# Patient Record
Sex: MALE | Race: WHITE | NOT HISPANIC OR LATINO | Employment: OTHER | URBAN - METROPOLITAN AREA
[De-identification: names, ages, dates, MRNs, and addresses within clinical notes are randomized per-mention and may not be internally consistent; named-entity substitution may affect disease eponyms.]

---

## 2017-09-21 ENCOUNTER — HISTORICAL (OUTPATIENT)
Dept: ADMINISTRATIVE | Facility: HOSPITAL | Age: 32
End: 2017-09-21

## 2022-04-09 ENCOUNTER — HISTORICAL (OUTPATIENT)
Dept: ADMINISTRATIVE | Facility: HOSPITAL | Age: 37
End: 2022-04-09

## 2022-04-26 VITALS
DIASTOLIC BLOOD PRESSURE: 77 MMHG | WEIGHT: 174.19 LBS | SYSTOLIC BLOOD PRESSURE: 108 MMHG | BODY MASS INDEX: 26.4 KG/M2 | HEIGHT: 68 IN

## 2024-01-09 DIAGNOSIS — R40.20 LOC (LOSS OF CONSCIOUSNESS): ICD-10-CM

## 2024-01-09 DIAGNOSIS — R42 DIZZINESS: Primary | ICD-10-CM

## 2024-03-16 ENCOUNTER — HOSPITAL ENCOUNTER (EMERGENCY)
Facility: HOSPITAL | Age: 39
Discharge: HOME | End: 2024-03-16
Attending: EMERGENCY MEDICINE
Payer: COMMERCIAL

## 2024-03-16 VITALS
OXYGEN SATURATION: 100 % | RESPIRATION RATE: 13 BRPM | TEMPERATURE: 98 F | HEIGHT: 68 IN | WEIGHT: 178.56 LBS | DIASTOLIC BLOOD PRESSURE: 79 MMHG | SYSTOLIC BLOOD PRESSURE: 130 MMHG | HEART RATE: 62 BPM | BODY MASS INDEX: 27.06 KG/M2

## 2024-03-16 DIAGNOSIS — R07.9 CHEST PAIN: Primary | ICD-10-CM

## 2024-03-16 LAB
ALBUMIN SERPL-MCNC: 4.3 G/DL (ref 3.5–5)
ALBUMIN/GLOB SERPL: 1.2 RATIO (ref 1.1–2)
ALP SERPL-CCNC: 78 UNIT/L (ref 40–150)
ALT SERPL-CCNC: 64 UNIT/L (ref 0–55)
AST SERPL-CCNC: 36 UNIT/L (ref 5–34)
BASOPHILS # BLD AUTO: 0.04 X10(3)/MCL
BASOPHILS NFR BLD AUTO: 0.8 %
BILIRUB SERPL-MCNC: 0.3 MG/DL
BNP BLD-MCNC: <10 PG/ML
BUN SERPL-MCNC: 13.5 MG/DL (ref 8.9–20.6)
CALCIUM SERPL-MCNC: 9.7 MG/DL (ref 8.4–10.2)
CHLORIDE SERPL-SCNC: 110 MMOL/L (ref 98–107)
CO2 SERPL-SCNC: 25 MMOL/L (ref 22–29)
CREAT SERPL-MCNC: 0.82 MG/DL (ref 0.73–1.18)
D DIMER PPP IA.FEU-MCNC: 1.94 UG/ML FEU (ref 0–0.5)
EOSINOPHIL # BLD AUTO: 0.22 X10(3)/MCL (ref 0–0.9)
EOSINOPHIL NFR BLD AUTO: 4.5 %
ERYTHROCYTE [DISTWIDTH] IN BLOOD BY AUTOMATED COUNT: 12.1 % (ref 11.5–17)
GFR SERPLBLD CREATININE-BSD FMLA CKD-EPI: >60 MLS/MIN/1.73/M2
GLOBULIN SER-MCNC: 3.5 GM/DL (ref 2.4–3.5)
GLUCOSE SERPL-MCNC: 98 MG/DL (ref 74–100)
HCT VFR BLD AUTO: 38.4 % (ref 42–52)
HGB BLD-MCNC: 13.9 G/DL (ref 14–18)
IMM GRANULOCYTES # BLD AUTO: 0.01 X10(3)/MCL (ref 0–0.04)
IMM GRANULOCYTES NFR BLD AUTO: 0.2 %
LYMPHOCYTES # BLD AUTO: 1.77 X10(3)/MCL (ref 0.6–4.6)
LYMPHOCYTES NFR BLD AUTO: 36.1 %
MCH RBC QN AUTO: 31.9 PG (ref 27–31)
MCHC RBC AUTO-ENTMCNC: 36.2 G/DL (ref 33–36)
MCV RBC AUTO: 88.1 FL (ref 80–94)
MONOCYTES # BLD AUTO: 0.31 X10(3)/MCL (ref 0.1–1.3)
MONOCYTES NFR BLD AUTO: 6.3 %
NEUTROPHILS # BLD AUTO: 2.55 X10(3)/MCL (ref 2.1–9.2)
NEUTROPHILS NFR BLD AUTO: 52.1 %
NRBC BLD AUTO-RTO: 0 %
PLATELET # BLD AUTO: 315 X10(3)/MCL (ref 130–400)
PMV BLD AUTO: 10.5 FL (ref 7.4–10.4)
POTASSIUM SERPL-SCNC: 3.7 MMOL/L (ref 3.5–5.1)
PROT SERPL-MCNC: 7.8 GM/DL (ref 6.4–8.3)
RBC # BLD AUTO: 4.36 X10(6)/MCL (ref 4.7–6.1)
SODIUM SERPL-SCNC: 143 MMOL/L (ref 136–145)
TROPONIN I SERPL-MCNC: <0.01 NG/ML (ref 0–0.04)
TSH SERPL-ACNC: 0.5 UIU/ML (ref 0.35–4.94)
WBC # SPEC AUTO: 4.9 X10(3)/MCL (ref 4.5–11.5)

## 2024-03-16 PROCEDURE — 85379 FIBRIN DEGRADATION QUANT: CPT | Performed by: PHYSICIAN ASSISTANT

## 2024-03-16 PROCEDURE — 84484 ASSAY OF TROPONIN QUANT: CPT | Performed by: PHYSICIAN ASSISTANT

## 2024-03-16 PROCEDURE — 83880 ASSAY OF NATRIURETIC PEPTIDE: CPT | Performed by: PHYSICIAN ASSISTANT

## 2024-03-16 PROCEDURE — 85025 COMPLETE CBC W/AUTO DIFF WBC: CPT | Performed by: PHYSICIAN ASSISTANT

## 2024-03-16 PROCEDURE — 80053 COMPREHEN METABOLIC PANEL: CPT | Performed by: PHYSICIAN ASSISTANT

## 2024-03-16 PROCEDURE — 25500020 PHARM REV CODE 255

## 2024-03-16 PROCEDURE — 84443 ASSAY THYROID STIM HORMONE: CPT | Performed by: PHYSICIAN ASSISTANT

## 2024-03-16 PROCEDURE — 99285 EMERGENCY DEPT VISIT HI MDM: CPT | Mod: 25

## 2024-03-16 RX ADMIN — IOHEXOL 100 ML: 350 INJECTION, SOLUTION INTRAVENOUS at 12:03

## 2024-03-16 NOTE — ED PROVIDER NOTES
Encounter Date: 3/16/2024          Disregard this template; please see note written on same date of service .         Matt Louie MD  03/18/24 6448

## 2024-03-16 NOTE — ED PROVIDER NOTES
Encounter Date: 3/16/2024       History     Chief Complaint   Patient presents with    Chest Pain    Headache     C/o chest pain intermittently x 1 week with slight headache.      38-year-old male with past medical history significant for thyroid disease and anxiety presents to ED complaining of one-week history intermittent chest pain and mild headache.  Patient reports chest pain seems to come on at random both at rest and with exertion and denies any identifiable aggravating factor.  Patient reports chest pain lasts for 10-15 seconds and feels like his substernal region is compressing into his mid upper back.  Reports 1 episode of chest pain improving with going from lying down to sitting up, but denies any consistent identifiable alleviating factor.  Patient reports when chest pain occurs he has associated shortness of breath, palpitations and nausea.  Patient reports he was previously referred to a cardiologist by his PCP for low blood pressure and he wore a Holter monitor for one-week and was told he had frequent PACs and PVCs and also that his heart rate would drop into the 30s while he was sleeping.  Patient reports cardiologist told him outside of these things there was nothing wrong with him and nothing needed to be done about it.  Reports he also had an echo, but does not know the results.  Reports he did not ever have a stress test.  Denies diaphoresis, syncope, orthopnea, edema, calf pain, hemoptysis, abdominal pain, vomiting.  Vital signs stable on arrival, patient in no acute distress.      Review of patient's allergies indicates:   Allergen Reactions    Meclizine Other (See Comments)     Severe mood swings     Past Medical History:   Diagnosis Date    Thyroid disease      History reviewed. No pertinent surgical history.  History reviewed. No pertinent family history.  Social History     Tobacco Use    Smoking status: Never    Smokeless tobacco: Never   Substance Use Topics    Alcohol use: Yes      Comment: occ    Drug use: Never     Review of Systems   All other systems reviewed and are negative.      Physical Exam     Initial Vitals [03/16/24 0913]   BP Pulse Resp Temp SpO2   132/86 60 20 97.5 °F (36.4 °C) 99 %      MAP       --         Physical Exam    Nursing note and vitals reviewed.  Constitutional: He appears well-developed and well-nourished. He is not diaphoretic. No distress.   HENT:   Head: Normocephalic and atraumatic.   Eyes: Conjunctivae and EOM are normal. Pupils are equal, round, and reactive to light.   Neck: Neck supple. No tracheal deviation present. No JVD present.   Normal range of motion.  Cardiovascular:  Normal rate, regular rhythm, normal heart sounds and intact distal pulses.     Exam reveals no gallop and no friction rub.       No murmur heard.  Pulmonary/Chest: Breath sounds normal. No respiratory distress. He has no wheezes. He has no rhonchi. He has no rales.   Abdominal: Abdomen is soft. Bowel sounds are normal. He exhibits no distension, no abdominal bruit and no pulsatile midline mass. There is no abdominal tenderness. There is no rebound and no guarding.   Musculoskeletal:         General: No tenderness or edema. Normal range of motion.      Cervical back: Normal range of motion and neck supple.     Neurological: He is alert and oriented to person, place, and time. He has normal strength. No cranial nerve deficit or sensory deficit. GCS score is 15. GCS eye subscore is 4. GCS verbal subscore is 5. GCS motor subscore is 6.   Skin: Skin is warm and dry. Capillary refill takes less than 2 seconds. No pallor.   Psychiatric: His speech is normal and behavior is normal. Judgment and thought content normal. His mood appears anxious. His affect is not angry, not blunt, not labile and not inappropriate. He is not actively hallucinating. Cognition and memory are normal. He does not exhibit a depressed mood. He is attentive.         ED Course   Procedures  Labs Reviewed   COMPREHENSIVE  METABOLIC PANEL - Abnormal; Notable for the following components:       Result Value    Chloride 110 (*)     Alanine Aminotransferase 64 (*)     Aspartate Aminotransferase 36 (*)     All other components within normal limits   D DIMER, QUANTITATIVE - Abnormal; Notable for the following components:    D-Dimer 1.94 (*)     All other components within normal limits   CBC WITH DIFFERENTIAL - Abnormal; Notable for the following components:    RBC 4.36 (*)     Hgb 13.9 (*)     Hct 38.4 (*)     MCH 31.9 (*)     MCHC 36.2 (*)     MPV 10.5 (*)     All other components within normal limits   B-TYPE NATRIURETIC PEPTIDE - Normal   TROPONIN I - Normal   TSH - Normal   CBC W/ AUTO DIFFERENTIAL    Narrative:     The following orders were created for panel order CBC Auto Differential.  Procedure                               Abnormality         Status                     ---------                               -----------         ------                     CBC with Differential[[id number]]       Abnormal            Final result                 Please view results for these tests on the individual orders.   EXTRA TUBES    Narrative:     The following orders were created for panel order EXTRA TUBES.  Procedure                               Abnormality         Status                     ---------                               -----------         ------                     Red Top Hold[[id number]]                                                               Gold Top Hold[[id number]]                                                                Please view results for these tests on the individual orders.   RED TOP HOLD   GOLD TOP HOLD     EKG Readings: (Independently Interpreted)   Initial Reading: No STEMI. Rhythm: Normal Sinus Rhythm. Heart Rate: 61. Ectopy: PACs. Conduction: Normal. ST Segments: Normal ST Segments. Axis: Normal. Clinical Impression: Normal Sinus Rhythm       Imaging Results              CTA Chest Non-Coronary (PE  Studies) (Final result)  Result time 03/16/24 12:22:24      Final result by Remington Li MD (03/16/24 12:22:24)                   Impression:      No evidence of pulmonary thromboembolism.      Electronically signed by: Remington Li  Date:    03/16/2024  Time:    12:22               Narrative:    EXAMINATION:  CTA CHEST NON CORONARY (PE STUDIES)    CLINICAL HISTORY:  Pulmonary embolism (PE) suspected, positive D-dimer;    TECHNIQUE:  Axial images of the chest were obtained with contrast. Sagittal and coronal reconstructed images were available for review. 3-D MIP reconstructions were performed from source imaging.    Automatic dose control was utilized to reduce patient radiation dose.    DLP= 243    COMPARISON:  03/16/2024    FINDINGS:  PULMONARY ARTERY: No evidence of pulmonary thromboembolism.    AORTA: Normal in course and caliber.    THYROID GLAND: The visualized thyroid is unremarkable.    AIRWAYS: Trachea is midline and tracheobronchial tree is patent.    HEART: The heart is normal in size. There is no pericardial effusion.    LUNGS: There are no new masses or nodules identified. No pleural effusion or pneumothorax.    LYMPH NODES: There is no significant mediastinal, axillary or hilar lymphadenopathy.    BONES: No displaced fracture. No aggressive appearing osseous lesion identified.    UPPER ABDOMEN:  The visualized abdomen is unremarkable.                                         X-Ray Chest 1 View (Final result)  Result time 03/16/24 10:53:16      Final result by Remington Li MD (03/16/24 10:53:16)                   Impression:      No acute cardiopulmonary process.      Electronically signed by: Remington Li  Date:    03/16/2024  Time:    10:53               Narrative:    EXAMINATION:  XR CHEST 1 VIEW    CLINICAL HISTORY:  chest pain;    TECHNIQUE:  Single view of the chest    COMPARISON:  02/10/2020    FINDINGS:  No focal opacification, pleural effusion, or pneumothorax.    The  cardiomediastinal silhouette is within normal limits.    No acute osseous abnormality.                                       Medications   iohexoL (OMNIPAQUE 350) 350 mg iodine/mL injection (100 mLs  Given 3/16/24 1200)     Medical Decision Making  Differential diagnosis: Includes but not limited to ACS, arrhythmia, electrolyte abnormality, CHF, pericarditis, aortic dissection, PE, anxiety    ED management:  No indication for acute intervention in ED at this time.    ED course:  EKG reveals rare PACs, but otherwise unremarkable, including no ischemic changes.  Troponin within normal limits.  Patient has low risk heart and GINGER scores, low suspicion for ACS.  BNP within normal limits and no evidence of fluid overload on exam, low suspicion for acute CHF.  CBC reveals mild normocytic, hypochromic anemia with H&H of 13.9 and 38.4; overall unremarkable.  CMP reveals mild transaminitis with ALT of 64 and AST of 36; overall unremarkable.  D-dimer elevated to 1.94 so CTA ordered, but shows no evidence of PE, aortic dissection or other acute cardiopulmonary abnormality.  Patient is nontoxic appearing with normal vitals, stable for discharge.  I will place referral to Internal Medicine Clinic to establish PCP and to cardiology clinic for further evaluation.  Strict ED precautions given for new or worsening symptoms and patient verbalized understanding.  All test results explained and all questions answered.    Amount and/or Complexity of Data Reviewed  Labs: ordered. Decision-making details documented in ED Course.  Radiology: ordered. Decision-making details documented in ED Course.  ECG/medicine tests: ordered and independent interpretation performed. Decision-making details documented in ED Course.               ED Course as of 03/16/24 1250   Sat Mar 16, 2024   1103 Negative troponin ; [CT]   1103 Normal bnp ; [CT]   1103 Modestly elevated dimer ; [CT]   1103 Reassuring chemistries ; [CT]   1104 Reassuring hemogram ;  [CT]      ED Course User Index  [CT] Matt Louie MD                           Clinical Impression:  Final diagnoses:  [R07.9] Chest pain (Primary)          ED Disposition Condition    Discharge Good          ED Prescriptions    None       Follow-up Information       Follow up With Specialties Details Why Contact Info Additional Information    [facility] Internal Medicine In 2 weeks  [address] [facility] [address] [phone] Internal Medicine Clinic Entrance #1    [facility] Cardiology Cardiology In 2 weeks  [address] [facility] [address] [phone]     [facility] Emergency Dept Emergency Medicine  As needed, If symptoms worsen Critical access hospital[address] [facility] [address] [phone]              Chemo Russo PA  03/16/24 5854

## 2024-03-16 NOTE — DISCHARGE INSTRUCTIONS
Report to Emergency Department if symptoms return or worsen; [facility] Medicine Clinic Within 1 to 2 days, It is important that you follow up with your primary care provider or specialist if indicated for further evaluation, workup, and treatment as necessary. The exam and treatment you received in Emergency Department was for an urgent problem and NOT INTENDED AS COMPLETE CARE. It is important that you FOLLOW UP with a doctor for ongoing care. If your symptoms become WORSE or you DO NOT IMPROVE and you are unable to reach your health care provider, you should RETURN to the Emergency Department. The Emergency Department provider has provided a PRELIMINARY INTERPRETATION of all your studies. A final interpretation may be done after you are discharged. If a change in your diagnosis or treatment is needed WE WILL CONTACT YOU. It is critical that we have a CURRENT PHONE NUMBER FOR YOU.

## 2024-03-27 ENCOUNTER — OFFICE VISIT (OUTPATIENT)
Dept: NEUROLOGY | Facility: CLINIC | Age: 39
End: 2024-03-27
Payer: COMMERCIAL

## 2024-03-27 VITALS
HEIGHT: 68 IN | WEIGHT: 175 LBS | SYSTOLIC BLOOD PRESSURE: 122 MMHG | BODY MASS INDEX: 26.52 KG/M2 | DIASTOLIC BLOOD PRESSURE: 84 MMHG

## 2024-03-27 DIAGNOSIS — R20.2 PARESTHESIAS IN LEFT HAND: Primary | ICD-10-CM

## 2024-03-27 DIAGNOSIS — M79.601 PAIN IN BOTH UPPER EXTREMITIES: ICD-10-CM

## 2024-03-27 DIAGNOSIS — R40.20 LOC (LOSS OF CONSCIOUSNESS): ICD-10-CM

## 2024-03-27 DIAGNOSIS — R42 DIZZINESS: ICD-10-CM

## 2024-03-27 DIAGNOSIS — R20.2 PARESTHESIAS IN RIGHT HAND: ICD-10-CM

## 2024-03-27 DIAGNOSIS — M79.602 PAIN IN BOTH UPPER EXTREMITIES: ICD-10-CM

## 2024-03-27 PROCEDURE — 1159F MED LIST DOCD IN RCRD: CPT | Mod: CPTII,S$GLB,, | Performed by: SPECIALIST

## 2024-03-27 PROCEDURE — 99999 PR PBB SHADOW E&M-EST. PATIENT-LVL III: CPT | Mod: PBBFAC,,, | Performed by: SPECIALIST

## 2024-03-27 PROCEDURE — 99205 OFFICE O/P NEW HI 60 MIN: CPT | Mod: S$GLB,,, | Performed by: SPECIALIST

## 2024-03-27 PROCEDURE — 3079F DIAST BP 80-89 MM HG: CPT | Mod: CPTII,S$GLB,, | Performed by: SPECIALIST

## 2024-03-27 PROCEDURE — 3074F SYST BP LT 130 MM HG: CPT | Mod: CPTII,S$GLB,, | Performed by: SPECIALIST

## 2024-03-27 PROCEDURE — 3008F BODY MASS INDEX DOCD: CPT | Mod: CPTII,S$GLB,, | Performed by: SPECIALIST

## 2024-03-27 NOTE — PROGRESS NOTES
"Subjective:      @Patient ID: Bro Koo Jr. is a 38 y.o. male.    Chief Complaint: NP ref by Dr Alexander for neuro cons to eval for Dizziness.      HPI:            C/O B arms sharp stabbing pain B arm and hand weakness Pts wife and son are here today.C/O numbness in the last 3 fingers on ruvalcaba hands. Drops thing for abt 4 months. C/O dizziness, light head w Ha. Pt doing traction w chiropractor. Gets a Ha daily. Ha a stabbing pain in occa area lasting a few hrs.  )    See also 'facesheet' under media poss handwr notes     Review of Systems       [] Single     [x]     [] []   [] Working     [] Retired, worked as:   [] Drives     [] Does not drive   ----------------------------  Thyroid disease  ..  No current outpatient medications   Objective:      Exam:   Visit Vitals  /84   Ht 5' 8" (1.727 m)   Wt 79.4 kg (175 lb)   BMI 26.61 kg/m²     General Exam  If Accompanied, by__       body habitus_ Body mass index is 26.61 kg/m².  Gen exam     Neurological:  [x]Normal neuro exam        CN's normal   Motor ok   Reflexes ok   No UMN findings or LMN findings   Neg tinel's at wrist and elbows   Normal gait and coord       Neuroimaging:  [x] Images and imaging reports reviewed. Rads summary:  My comments:  agree brain and C spine ok   has disc bulge 3-4 but no neural compromise     Labs:    [x]  New Patient         []  Multiple Issues/ diagnoses or problems  [if not enumerated in note then discussed but not documented]    Complexity of Data:   [x] High    [] Moderate   [x] Images and reports reviewed [x] History obtained from accompaniment  [] Studies ordered [] Studies consid or discussed, not ordered   [x] Differential Diagnoses discussed       Risks:   [] High     [x] Moderate   [] (poss or def) neurodegenerative condition [] () autoimmune condition with possibility of flares or unexpected attack  [] () seiz d.o. with possib of recurr seiz's  [] Cerebrovasc ds with risk of recurrent " stroke  [] CNS meds (and/or) potentially high risk non CNS meds taken or discussed which may cause med or behav SE's  [] Fall risk [] Driving discussed  [x] Diagnosis unclear or DDx wide making risk uncertain   []:    MDM:    [x] High    Despite no orders       Assessment/Plan:         ICD-10-CM ICD-9-CM   1. Paresthesias in left hand  R20.2 782.0   2. Pain in both upper extremities  M79.601 729.5    M79.602    3. Paresthesias in right hand  R20.2 782.0   4. Dizziness  R42 780.4         Other Comments / Follow Up:          Apologies I have no unifying diagnosis for him nor can I help his symptoms   I see no reason for driving restrictions based on the history I get today     No orders of the defined types were placed in this encounter.     Patient Instructions          MD JOSELINE TaiA FAAN, [facility]  [facility] Medical Director   [facility]

## 2024-05-01 ENCOUNTER — OFFICE VISIT (OUTPATIENT)
Dept: INTERNAL MEDICINE | Facility: CLINIC | Age: 39
End: 2024-05-01
Payer: COMMERCIAL

## 2024-05-01 VITALS
DIASTOLIC BLOOD PRESSURE: 74 MMHG | RESPIRATION RATE: 20 BRPM | HEART RATE: 89 BPM | HEIGHT: 68 IN | TEMPERATURE: 98 F | OXYGEN SATURATION: 97 % | WEIGHT: 183.88 LBS | BODY MASS INDEX: 27.87 KG/M2 | SYSTOLIC BLOOD PRESSURE: 99 MMHG

## 2024-05-01 DIAGNOSIS — Z11.3 ROUTINE SCREENING FOR STI (SEXUALLY TRANSMITTED INFECTION): ICD-10-CM

## 2024-05-01 DIAGNOSIS — Z76.89 ENCOUNTER TO ESTABLISH CARE: Primary | ICD-10-CM

## 2024-05-01 DIAGNOSIS — Z13.21 ENCOUNTER FOR VITAMIN DEFICIENCY SCREENING: ICD-10-CM

## 2024-05-01 DIAGNOSIS — Z11.59 SCREENING FOR VIRAL DISEASE: ICD-10-CM

## 2024-05-01 DIAGNOSIS — Z13.1 SCREENING FOR DIABETES MELLITUS: ICD-10-CM

## 2024-05-01 DIAGNOSIS — Z11.4 SCREENING FOR HIV (HUMAN IMMUNODEFICIENCY VIRUS): ICD-10-CM

## 2024-05-01 DIAGNOSIS — Z86.39 H/O HYPERTHYROIDISM: ICD-10-CM

## 2024-05-01 DIAGNOSIS — Z13.220 SCREENING FOR LIPID DISORDERS: ICD-10-CM

## 2024-05-01 DIAGNOSIS — Z13.0 SCREENING FOR IRON DEFICIENCY ANEMIA: ICD-10-CM

## 2024-05-01 DIAGNOSIS — R07.89 OTHER CHEST PAIN: ICD-10-CM

## 2024-05-01 DIAGNOSIS — M54.12 CERVICAL RADICULOPATHY: ICD-10-CM

## 2024-05-01 DIAGNOSIS — Z12.5 SCREENING FOR PROSTATE CANCER: ICD-10-CM

## 2024-05-01 PROCEDURE — 99215 OFFICE O/P EST HI 40 MIN: CPT | Mod: PBBFAC

## 2024-05-01 PROCEDURE — 3074F SYST BP LT 130 MM HG: CPT | Mod: CPTII,,,

## 2024-05-01 PROCEDURE — 3078F DIAST BP <80 MM HG: CPT | Mod: CPTII,,,

## 2024-05-01 PROCEDURE — 99204 OFFICE O/P NEW MOD 45 MIN: CPT | Mod: S$PBB,,,

## 2024-05-01 PROCEDURE — 3008F BODY MASS INDEX DOCD: CPT | Mod: CPTII,,,

## 2024-05-01 RX ORDER — NAPROXEN SODIUM 550 MG/1
550 TABLET ORAL 2 TIMES DAILY
COMMUNITY
Start: 2024-01-28

## 2024-05-01 RX ORDER — BACLOFEN 10 MG/1
10 TABLET ORAL 2 TIMES DAILY
COMMUNITY
Start: 2024-01-28 | End: 2024-05-03

## 2024-05-01 RX ORDER — GABAPENTIN 300 MG/1
300 CAPSULE ORAL NIGHTLY
Qty: 90 CAPSULE | Refills: 0 | Status: SHIPPED | OUTPATIENT
Start: 2024-05-01 | End: 2025-05-01

## 2024-05-01 RX ORDER — SUMATRIPTAN SUCCINATE 100 MG/1
TABLET ORAL
COMMUNITY
Start: 2024-01-28

## 2024-05-01 NOTE — PATIENT INSTRUCTIONS
REMINDER: Please complete labs within 1 week of appointment.   Please complete satisfaction survey when received. Thank you.    Sachin Crabtree,     If you are due for any health screening(s) below please notify me so we can arrange them to be ordered and scheduled. Most healthy patients at your age complete them, but you are free to accept or refuse.     If you can't do it, I'll definitely understand. If you can, I'd certainly appreciate it!    All of your core healthy metrics are met.

## 2024-05-01 NOTE — ASSESSMENT & PLAN NOTE
Wellness labs ordered - to be discussed at next office visit.  Encouraged patient to utilize patient portal for messaging.  Staff to request records from previous PCP, Dr. Valderrama.

## 2024-05-01 NOTE — PROGRESS NOTES
PATIENT NAME: Bro Koo Jr.  : 1985  DATE: 24  MRN: [id number]          Reason for Visit/Chief Complaint   Establish Care       History of Present Illness (HPI)     Bro Koo Jr. is a 38 y.o. White male presenting in clinic today to Establish Care. Previous PCP: Dr. Lamont Alexander. PMH: kidney stones, headaches, thyroid disease (hyperthyroid took radiation pill in ), anxiety. Previously followed by Dr. Luis Berger (neurologist) for headaches. He is currently followed by Dr. Rhona Schmidt ([facility] - chiropractor) only doing traction. EMG done with Dr. Devan Preston. MRI brain and spine was complete at [facility] in [place]. Previously followed by Dr. Valderrama ([facility]) for chest pain, will now establish care with [facility] cardiology on 2024. Patient's wife, Verenice, is present during visit.    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 This seems to be chronic in nature as patient was previously diagnosed with cervical radiculopathy on 2020. Current symptoms include numbness, tingling, dropping things from hands.     Denies smoking, drinking, or illicit drug use. Denies chest pain, shortness of breath, cough, headache, dizziness, weakness, abdominal pain, nausea, vomiting, diarrhea, constipation, dysuria, depression, anxiety, SI, and HI.        Review of Systems     Review of Systems   Constitutional: Negative.    HENT: Negative.     Eyes: Negative.    Respiratory: Negative.     Cardiovascular: Negative.    Gastrointestinal: Negative.    Endocrine: Negative.    Genitourinary: Negative.    Musculoskeletal:  Positive for arthralgias, neck pain and neck stiffness.   Skin: Negative.    Allergic/Immunologic: Negative.    Neurological:  Positive for weakness (bilat hands).   Hematological: Negative.    Psychiatric/Behavioral: Negative.     All other systems reviewed and are negative.      Medical / Social /  "Family History     Past Medical History:   Diagnosis Date    Thyroid disease      No past surgical history on file.    Social History  Bro Koo Jr.'s  reports that he has never smoked. He has never used smokeless tobacco. He reports current alcohol use of about 1.0 standard drink of alcohol per week. He reports that he does not use drugs.    Family History  Bro Koo Jr.'s family history includes Alcohol abuse in his maternal uncle; Arthritis in his maternal grandfather, maternal grandmother, and mother; Birth defects in his maternal grandmother; Cancer in his maternal grandfather; Depression in his maternal grandmother and mother; Hyperlipidemia in his maternal grandmother; Hypertension in his father and mother; Stroke in his maternal grandmother.    Medications and Allergies     Medications  Current Outpatient Medications   Medication Instructions    gabapentin (NEURONTIN) 300 mg, Oral, Nightly    naproxen sodium (ANAPROX) 550 mg, Oral, 2 times daily    sumatriptan (IMITREX) 100 MG tablet        Allergies  Review of patient's allergies indicates:   Allergen Reactions    Meclizine Other (See Comments)     Severe mood swings       Physical Examination   Visit Vitals  BP 99/74 (BP Location: Left arm, Patient Position: Sitting, BP Method: Small (Automatic))   Pulse 89   Temp 97.9 °F (36.6 °C) (Oral)   Resp 20   Ht 5' 8" (1.727 m)   Wt 83.4 kg (183 lb 13.8 oz)   SpO2 97%   BMI 27.96 kg/m²     Physical Exam  Vitals reviewed.   Constitutional:       Appearance: Normal appearance. He is normal weight.   HENT:      Head: Normocephalic.      Nose: Nose normal.      Mouth/Throat:      Mouth: Mucous membranes are moist.      Pharynx: Oropharynx is clear.   Eyes:      Extraocular Movements: Extraocular movements intact.      Conjunctiva/sclera: Conjunctivae normal.      Pupils: Pupils are equal, round, and reactive to light.   Cardiovascular:      Rate and Rhythm: Normal rate and regular rhythm.      " "Heart sounds: Normal heart sounds.   Pulmonary:      Effort: Pulmonary effort is normal.      Breath sounds: Normal breath sounds.   Abdominal:      General: Abdomen is flat. Bowel sounds are normal.      Palpations: Abdomen is soft.   Musculoskeletal:         General: Normal range of motion.      Cervical back: Normal range of motion.   Skin:     General: Skin is warm and dry.      Capillary Refill: Capillary refill takes less than 2 seconds.   Neurological:      General: No focal deficit present.      Mental Status: He is alert and oriented to person, place, and time.   Psychiatric:         Mood and Affect: Mood normal.           Results     Lab Results   Component Value Date    WBC 4.90 03/16/2024    RBC 4.36 (L) 03/16/2024    HGB 13.9 (L) 03/16/2024    HCT 38.4 (L) 03/16/2024    MCV 88.1 03/16/2024    MCH 31.9 (H) 03/16/2024    MCHC 36.2 (H) 03/16/2024    RDW 12.1 03/16/2024     03/16/2024    MPV 10.5 (H) 03/16/2024      Lab Results   Component Value Date     03/16/2024    K 3.7 03/16/2024    CHLORIDE 110 (H) 03/16/2024    CO2 25 03/16/2024    GLUCOSE 98 03/16/2024    BUN 13.5 03/16/2024    CREATININE 0.82 03/16/2024    LABPROT 7.8 03/16/2024    ALBUMIN 4.3 03/16/2024    BILITOT 0.3 03/16/2024    ALKPHOS 78 03/16/2024    AST 36 (H) 03/16/2024    ALT 64 (H) 03/16/2024    EGFRNORACEVR >60 03/16/2024     Lab Results   Component Value Date    TSH 0.496 03/16/2024     No results found for: "CHOL", "HDL", "LDL", "TRIG"  Lab Results   Component Value Date    PROTEINUA Negative 01/09/2020    BILIRUBINUA Negative 01/09/2020    WBCUA 0-2 01/09/2020    RBCUA 3-5 (A) 01/09/2020    BACTERIA None Seen 01/09/2020    LEUKOCYTESUR Negative 01/09/2020    UROBILINOGEN Normal 01/09/2020     No results found for: "CREATRANDUR", "MICALBCREAT"  No results found for: "UORUHSCP45FJ", "V12", "FOLATE"  No results found for: "HIV", "HEPAIGM", "HEPBCOREM", "HEPBSAG", "HEPCAB"  No results found for: "FITDIAG", "COLOGUARD"  No " "results found for: "OCCBLDIA"    Assessment and Plan (including Health Maintenance)     Problem List Items Addressed This Visit          Neuro    Cervical radiculopathy    Current Assessment & Plan     Rx gabapentin.  Continue naproxen as prescribed.  Staff to request MRI, EMG.           Relevant Medications    gabapentin (NEURONTIN) 300 MG capsule       Cardiac/Vascular    Chest pain    Current Assessment & Plan     Strict ED precautions.  Keep scheduled appt with [facility] cardiology to establish care on 52/2024.            Endocrine    BMI 27.0-27.9,adult    Current Assessment & Plan     Body mass index is 27.96 kg/m². (At goal).          H/O hyperthyroidism    Current Assessment & Plan     Thyroid studies ordered.         Relevant Orders    TSH    T4, Free    THYROID PEROXIDASE (TPO)    T3, Free (OLG)       Other    Encounter to establish care - Primary    Current Assessment & Plan     Wellness labs ordered - to be discussed at next office visit.  Encouraged patient to utilize patient portal for messaging.  Staff to request records from previous PCP, Dr. Valderrama.          Relevant Orders    Urinalysis, Reflex to Urine Culture    Microalbumin/Creatinine Ratio, Urine    Comprehensive Metabolic Panel    CBC Auto Differential     Other Visit Diagnoses       Routine screening for STI (sexually transmitted infection)        Relevant Orders    SYPHILIS ANTIBODY (WITH REFLEX RPR)    Chlamydia/GC, PCR    Screening for iron deficiency anemia        Relevant Orders    Ferritin    Iron and TIBC    Screening for diabetes mellitus        Relevant Orders    Hemoglobin A1C    Screening for viral disease        Relevant Orders    Hepatitis Panel, Acute    Screening for HIV (human immunodeficiency virus)        Relevant Orders    HIV 1/2 Ag/Ab (4th Gen)    Screening for lipid disorders        Relevant Orders    Lipid Panel    Screening for prostate cancer        Relevant Orders    PSA, Screening    Encounter for vitamin deficiency " screening        Relevant Orders    Vitamin D             Health Maintenance Due   Topic Date Due    Hepatitis C Screening  Never done    Lipid Panel  Never done    HIV Screening  Never done    TETANUS VACCINE  05/09/2013    Hemoglobin A1c (Diabetic Prevention Screening)  Never done    COVID-19 Vaccine (1 - 2023-24 season) Never done     All of your core healthy metrics are met.      The patient has no Health Maintenance topics of status Not Due    Future Appointments   Date Time Provider Department Center   5/20/2024 10:00 AM CV [facility] EXERCISE STRESS 01 [facility] CARDIA [place]    5/29/2024  7:40 AM [facility]    9/4/2024 10:30 AM Torie Elizabeth MD [facility]        Follow up in about 4 weeks (around 5/29/2024) for F2F, Follow up, Med check, Lab review, Wellness, RTC PRN.          Signature:        ILAN Schroeder  [facility]  [address] [id number]    Date of encounter: 5/1/24

## 2024-05-02 ENCOUNTER — OFFICE VISIT (OUTPATIENT)
Dept: CARDIOLOGY | Facility: CLINIC | Age: 39
End: 2024-05-02
Payer: COMMERCIAL

## 2024-05-02 VITALS
WEIGHT: 182.81 LBS | BODY MASS INDEX: 27.71 KG/M2 | SYSTOLIC BLOOD PRESSURE: 110 MMHG | HEIGHT: 68 IN | DIASTOLIC BLOOD PRESSURE: 75 MMHG | TEMPERATURE: 99 F | HEART RATE: 104 BPM | RESPIRATION RATE: 18 BRPM

## 2024-05-02 DIAGNOSIS — E05.90 HYPERTHYROIDISM: ICD-10-CM

## 2024-05-02 DIAGNOSIS — R06.09 DYSPNEA ON EXERTION: ICD-10-CM

## 2024-05-02 DIAGNOSIS — R07.9 CHEST PAIN: Primary | ICD-10-CM

## 2024-05-02 DIAGNOSIS — R00.2 PALPITATIONS: ICD-10-CM

## 2024-05-02 PROCEDURE — 99214 OFFICE O/P EST MOD 30 MIN: CPT | Mod: PBBFAC | Performed by: INTERNAL MEDICINE

## 2024-05-02 NOTE — PROGRESS NOTES
CHIEF COMPLAINT:   Chief Complaint   Patient presents with    New Patient                    Review of patient's allergies indicates:   Allergen Reactions    Meclizine Other (See Comments)     Severe mood swings                                          HPI:  Bro Koo . 38 y.o. male headaches, thyroid disease (hyperthyroid took radiation pill in 2006), anxiety presents as a new patient.  Patient referred in the ED where he presented with substernal chest pain.  He states that he occasionally feels this pain with stress and physical exertion.  Describes it as a squeezing feeling that goes away with rest.  It is associated with shortness of breath and palpitations.  Denies any nausea, vomiting, radiation of the pain during these episodes.  Patient states that he previously followed with [facility] and [facility].  Per patient, he had a monitor for 1 week which showed occasional PACs and PVCs.  Was told that there was no further workup necessary at that time.  Today, patient does endorse a squeezing like chest pain.  States that he was informed of bad news just prior to his visit.                                                                                                                                                                                                                                                                                                                                                                                                                                                                                        CARDIAC TESTING:  No results found for this or any previous visit.    No results found for this or any previous visit.     No results found for this or any previous visit.       Patient Active Problem List   Diagnosis    Paresthesias in left hand    Pain in both upper extremities    Paresthesias in right hand    Encounter to establish care    BMI 27.0-27.9,adult     No  past surgical history on file.  Social History     Socioeconomic History    Marital status:    Tobacco Use    Smoking status: Never    Smokeless tobacco: Never   Substance and Sexual Activity    Alcohol use: Yes     Alcohol/week: 1.0 standard drink of alcohol     Types: 1 Glasses of wine per week     Comment: Very very infrequent    Drug use: Never    Sexual activity: Yes     Partners: Female     Birth control/protection: Condom     Social Determinants of Health     Financial Resource Strain: Low Risk  (5/1/2024)    Overall Financial Resource Strain (CARDIA)     Difficulty of Paying Living Expenses: Not very hard   Food Insecurity: Food Insecurity Present (5/1/2024)    Hunger Vital Sign     Worried About Running Out of Food in the Last Year: Never true     Ran Out of Food in the Last Year: Sometimes true   Transportation Needs: No Transportation Needs (5/1/2024)    PRAPARE - Transportation     Lack of Transportation (Medical): No     Lack of Transportation (Non-Medical): No   Physical Activity: Insufficiently Active (5/1/2024)    Exercise Vital Sign     Days of Exercise per Week: 4 days     Minutes of Exercise per Session: 30 min   Stress: Stress Concern Present (5/1/2024)    [employer] - Occupational Stress Questionnaire     Feeling of Stress : To some extent   Housing Stability: Unknown (5/1/2024)    Housing Stability Vital Sign     Unable to Pay for Housing in the Last Year: No        Family History   Problem Relation Name Age of Onset    Hypertension Mother Yajaira     Arthritis Mother Yajaira     Depression Mother Yajaira     Hypertension Father      Arthritis Maternal Grandfather Vincent     Cancer Maternal Grandfather Vincent     Arthritis Maternal Grandmother geraldine     Birth defects Maternal Grandmother geraldine     Depression Maternal Grandmother geraldine     Hyperlipidemia Maternal Grandmother geraldine     Stroke Maternal Grandmother geraldine     Alcohol abuse Maternal Uncle Lamberto   "        Current Outpatient Medications:     gabapentin (NEURONTIN) 300 MG capsule, Take 1 capsule (300 mg total) by mouth every evening., Disp: 90 capsule, Rfl: 0    naproxen sodium (ANAPROX) 550 MG tablet, Take 550 mg by mouth 2 (two) times a day., Disp: , Rfl:     sumatriptan (IMITREX) 100 MG tablet, , Disp: , Rfl:     baclofen (LIORESAL) 10 MG tablet, Take 10 mg by mouth 2 (two) times daily. (Patient not taking: Reported on 5/1/2024), Disp: , Rfl:      ROS:                                                                                                                                                                             Constitutional: no fever/chills  EENT: no sore throat, ear pain, sinus pain/congestion, nasal congestion/drainage  Respiratory: no cough, no wheezing, + shortness of breath  Cardiovascular: + chest pain, + palpitations, no edema  Gastrointestinal: no nausea, vomiting, or diarrhea. No abdominal pain  Genitourinary: no dysuria, no urinary frequency or urgency, no hematuria  Integumentary: no skin rash or abnormal lesion  Neurologic: no headache, no dizziness, no weakness or numbness     Blood pressure 110/75, pulse 104, temperature 98.8 °F (37.1 °C), resp. rate 18, height 5' 8" (1.727 m), weight 82.9 kg (182 lb 12.8 oz).   PE:  General: well-developed, well-nourished, no acute distress  Eye: clear conjunctiva, eyelids normal  Head: normocephalic and atraumatic  Neck: full range of motion, supple  Respiratory: clear to auscultation bilaterally without wheezes, rales, rhonchi  Cardiovascular: regular rate and rhythm without murmurs. No JVD.   Gastrointestinal: soft, non-tender, non-distended with normal bowel sounds in all four quadrants. No masses palpated  Musculoskeletal: full range of motion of all extremities without limitation or discomfort  Integumentary: no rashes or skin lesions present, no erythema  Neurologic: AAO x 3, no dysarthria.  Psychiatric: cooperative with exam, good eye " contact.            ASSESSMENT/PLAN:    Chest pain   Palpitations  -Patient previously follow up with [facility] due to chest pain and palpitations.    -[facility] chart reviewed, patient had monitor which revealed occasional PACs.  No other significant findings.  -continues to endorse squeezing pain, shortness of breath and palpitations with exertion.  -Ordered treadmill EKG and lipid panel.    History of hyperthyroidism  -patient states he took a radiation pill in 2006  -Not currently on thyroid medication.  -TSH from 03/16/2024 was 0.496.  Within normal limits.      RTC in 4 months.    Rene Sawyer MD  [facility] Internal Medicine PGY-1

## 2024-05-03 PROBLEM — Z86.39 H/O HYPERTHYROIDISM: Status: ACTIVE | Noted: 2024-05-03

## 2024-05-03 PROBLEM — R07.9 CHEST PAIN: Status: ACTIVE | Noted: 2024-05-03

## 2024-05-03 PROBLEM — M54.12 CERVICAL RADICULOPATHY: Status: ACTIVE | Noted: 2024-05-03

## 2024-05-03 NOTE — ASSESSMENT & PLAN NOTE
Strict ED precautions.  Keep scheduled appt with [facility] cardiology to establish care on 52/2024.

## 2024-05-06 NOTE — PROGRESS NOTES
Cardiology attending addendum  Patient was seen and examined with Dr Rene Sawyer. Agree with plan as outlined below.    Torie Elizabeth MD  Cardiology staff-[facility]

## 2024-05-20 ENCOUNTER — HOSPITAL ENCOUNTER (OUTPATIENT)
Dept: CARDIOLOGY | Facility: HOSPITAL | Age: 39
Discharge: HOME | End: 2024-05-20
Attending: INTERNAL MEDICINE

## 2024-05-20 VITALS
DIASTOLIC BLOOD PRESSURE: 86 MMHG | RESPIRATION RATE: 20 BRPM | HEART RATE: 69 BPM | WEIGHT: 182.75 LBS | BODY MASS INDEX: 27.7 KG/M2 | SYSTOLIC BLOOD PRESSURE: 124 MMHG | HEIGHT: 68 IN

## 2024-05-20 DIAGNOSIS — R07.9 CHEST PAIN: ICD-10-CM

## 2024-05-20 LAB
CV STRESS BASE HR: 70 BPM
DIASTOLIC BLOOD PRESSURE: 86 MMHG
OHS CV CPX 85 PERCENT MAX PREDICTED HEART RATE MALE: 156
OHS CV CPX ESTIMATED METS: 10
OHS CV CPX MAX PREDICTED HEART RATE: 183
OHS CV CPX PATIENT IS FEMALE: 0
OHS CV CPX PATIENT IS MALE: 1
OHS CV CPX PEAK DIASTOLIC BLOOD PRESSURE: 88 MMHG
OHS CV CPX PEAK HEAR RATE: 187 BPM
OHS CV CPX PEAK RATE PRESSURE PRODUCT: NORMAL
OHS CV CPX PEAK SYSTOLIC BLOOD PRESSURE: 214 MMHG
OHS CV CPX PERCENT MAX PREDICTED HEART RATE ACHIEVED: 102
OHS CV CPX RATE PRESSURE PRODUCT PRESENTING: 8680
OHS QRS DURATION: 88 MS
OHS QTC CALCULATION: 439 MS
STRESS ECHO POST EXERCISE DUR MIN: 6 MINUTES
STRESS ECHO POST EXERCISE DUR SEC: 56 SECONDS
SYSTOLIC BLOOD PRESSURE: 124 MMHG

## 2024-05-20 PROCEDURE — 93017 CV STRESS TEST TRACING ONLY: CPT

## 2024-05-29 PROBLEM — E55.9 VITAMIN D DEFICIENCY: Status: ACTIVE | Noted: 2024-05-29

## 2024-05-29 PROBLEM — Z00.00 WELL ADULT EXAM: Status: ACTIVE | Noted: 2024-05-01

## 2024-07-16 ENCOUNTER — TELEPHONE (OUTPATIENT)
Dept: CARDIOLOGY | Facility: CLINIC | Age: 39
End: 2024-07-16

## 2024-07-16 NOTE — TELEPHONE ENCOUNTER
Attempted to contact patient to inform 09/04/24 apt time has been changed from 10:30 am to 2 pm with Yue STEPHENSON, but no answer.

## 2024-07-23 ENCOUNTER — TELEPHONE (OUTPATIENT)
Dept: INTERNAL MEDICINE | Facility: CLINIC | Age: 39
End: 2024-07-23

## 2024-07-23 ENCOUNTER — OFFICE VISIT (OUTPATIENT)
Dept: INTERNAL MEDICINE | Facility: CLINIC | Age: 39
End: 2024-07-23

## 2024-07-23 VITALS
SYSTOLIC BLOOD PRESSURE: 119 MMHG | HEART RATE: 79 BPM | TEMPERATURE: 98 F | RESPIRATION RATE: 18 BRPM | WEIGHT: 187.19 LBS | OXYGEN SATURATION: 96 % | BODY MASS INDEX: 28.37 KG/M2 | DIASTOLIC BLOOD PRESSURE: 86 MMHG | HEIGHT: 68 IN

## 2024-07-23 DIAGNOSIS — M54.12 CERVICAL RADICULOPATHY: ICD-10-CM

## 2024-07-23 DIAGNOSIS — Z12.5 SCREENING FOR PROSTATE CANCER: ICD-10-CM

## 2024-07-23 DIAGNOSIS — Z13.220 SCREENING FOR LIPID DISORDERS: ICD-10-CM

## 2024-07-23 DIAGNOSIS — E55.9 VITAMIN D DEFICIENCY: ICD-10-CM

## 2024-07-23 DIAGNOSIS — Z00.00 WELL ADULT EXAM: Primary | ICD-10-CM

## 2024-07-23 DIAGNOSIS — Z13.0 SCREENING FOR IRON DEFICIENCY ANEMIA: ICD-10-CM

## 2024-07-23 DIAGNOSIS — Z86.39 H/O HYPERTHYROIDISM: ICD-10-CM

## 2024-07-23 DIAGNOSIS — Z13.1 SCREENING FOR DIABETES MELLITUS: ICD-10-CM

## 2024-07-23 PROCEDURE — 99395 PREV VISIT EST AGE 18-39: CPT | Mod: S$PBB,,,

## 2024-07-23 PROCEDURE — 99214 OFFICE O/P EST MOD 30 MIN: CPT | Mod: S$PBB,25,,

## 2024-07-23 PROCEDURE — 99214 OFFICE O/P EST MOD 30 MIN: CPT | Mod: PBBFAC

## 2024-07-23 RX ORDER — GABAPENTIN 300 MG/1
300 CAPSULE ORAL NIGHTLY
Qty: 90 CAPSULE | Refills: 2 | Status: SHIPPED | OUTPATIENT
Start: 2024-07-23 | End: 2025-07-23

## 2024-07-23 RX ORDER — ASPIRIN 325 MG
50000 TABLET, DELAYED RELEASE (ENTERIC COATED) ORAL
Qty: 12 CAPSULE | Refills: 0 | Status: SHIPPED | OUTPATIENT
Start: 2024-07-23

## 2024-07-23 NOTE — ASSESSMENT & PLAN NOTE
Continue gabapentin as prescribed - refilled today.  11/22/2023-MRI cervical spine w/o contrast reveals mild degenerative changes in the cervical spine without spinal canal stenosis or nerve root impingement identified.  Patient is currently a self pay, will apply for medicaid, if approved, will then refer to [facility] neurosurgery.

## 2024-07-23 NOTE — ASSESSMENT & PLAN NOTE
Wellness labs - 5/02/2024  Prostate Cancer Screening - Last PSA - 2.09 on 5/20/2024. Follow up annually.   Colon Cancer Screening - Deferred due to age.   Osteoporosis Screening - Deferred due to age.   Eye Exam -Several years. List of local eye doctors given to patient.   Dental Exam - Several years. List of local dentists given to patient.   Vaccinations: Flu - 12/5/2023 / Tetanus - Declines

## 2024-07-23 NOTE — PROGRESS NOTES
PATIENT NAME: Bro Koo Jr.  : 1985  DATE: 24  MRN: [id number]          Reason for Visit/Chief Complaint   Follow-up and Labs Only       History of Present Illness (HPI)     Bro Koo Jr. is a 39 y.o. White male presenting in clinic today to Follow-up and Labs Only. PMH: kidney stones, headaches, thyroid disease (hyperthyroid took radiation pill in ), anxiety. Previously followed by Dr. Luis Berger (neurologist) for headaches. He is currently followed by Dr. Rhona Schmidt ([facility] - chiropractor) only doing traction. EMG done with Dr. Devan Preston - [facility].    Followed by:  [facility] cardiology - last office visit on 2024. Patient's wife, Verenice, is present during visit. On 2024-patient had Exercise Stress Test. Deemed low risk based on Paz score.     All pertinent labs dated 2024 reviewed and discussed with patient.   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 This seems to be chronic in nature as patient was previously diagnosed with cervical radiculopathy on 2020. Current symptoms include numbness, tingling, dropping things from hands. Recevied records from     Denies smoking, drinking, or illicit drug use. Denies chest pain, shortness of breath, cough, headache, dizziness, weakness, abdominal pain, nausea, vomiting, diarrhea, constipation, dysuria, depression, anxiety, SI, and HI.    Prostate Cancer Screening - Last PSA - 2.09 on 2024. Follow up annually.   Colon Cancer Screening - Deferred due to age.   Osteoporosis Screening - Deferred due to age.   Eye Exam -Several years. List of local eye doctors given to patient.   Dental Exam - Several years. List of local dentists given to patient.   Vaccinations: Flu - 2023 / Tetanus - Declines         Review of Systems     Review of Systems   Constitutional: Negative.    HENT: Negative.     Eyes: Negative.   "  Respiratory: Negative.     Cardiovascular: Negative.    Gastrointestinal: Negative.    Endocrine: Negative.    Genitourinary: Negative.    Musculoskeletal:  Positive for arthralgias, neck pain and neck stiffness.   Skin: Negative.    Allergic/Immunologic: Negative.    Neurological:  Positive for weakness (bilat hands).   Hematological: Negative.    Psychiatric/Behavioral: Negative.     All other systems reviewed and are negative.      Medical / Social / Family History     Past Medical History:   Diagnosis Date    Thyroid disease      History reviewed. No pertinent surgical history.    Social History  Bor Koo Jr.'s  reports that he has never smoked. He has never used smokeless tobacco. He reports current alcohol use of about 1.0 standard drink of alcohol per week. He reports that he does not use drugs.    Family History  Bro Koo Jr.'s family history includes Alcohol abuse in his maternal uncle; Arthritis in his maternal grandfather, maternal grandmother, and mother; Birth defects in his maternal grandmother; Cancer in his maternal grandfather; Depression in his maternal grandmother and mother; Hyperlipidemia in his maternal grandmother; Hypertension in his father and mother; Stroke in his maternal grandmother.    Medications and Allergies     Medications  Current Outpatient Medications   Medication Instructions    cholecalciferol (vitamin D3) 50,000 Units, Oral, Every 7 days    gabapentin (NEURONTIN) 300 mg, Oral, Nightly       Allergies  Review of patient's allergies indicates:   Allergen Reactions    Meclizine Other (See Comments)     Severe mood swings       Physical Examination   Visit Vitals  /86 (BP Location: Right arm, Patient Position: Sitting, BP Method: Large (Automatic))   Pulse 79   Temp 98.1 °F (36.7 °C) (Oral)   Resp 18   Ht 5' 8" (1.727 m)   Wt 84.9 kg (187 lb 2.7 oz)   SpO2 96%   BMI 28.46 kg/m²       Physical Exam  Vitals reviewed.   Constitutional:       " Appearance: Normal appearance. He is normal weight.   HENT:      Head: Normocephalic.      Nose: Nose normal.      Mouth/Throat:      Mouth: Mucous membranes are moist.      Pharynx: Oropharynx is clear.   Eyes:      Extraocular Movements: Extraocular movements intact.      Conjunctiva/sclera: Conjunctivae normal.      Pupils: Pupils are equal, round, and reactive to light.   Cardiovascular:      Rate and Rhythm: Normal rate and regular rhythm.      Heart sounds: Normal heart sounds.   Pulmonary:      Effort: Pulmonary effort is normal.      Breath sounds: Normal breath sounds.   Abdominal:      General: Abdomen is flat. Bowel sounds are normal.      Palpations: Abdomen is soft.   Musculoskeletal:         General: Normal range of motion.      Cervical back: Normal range of motion.   Skin:     General: Skin is warm and dry.      Capillary Refill: Capillary refill takes less than 2 seconds.   Neurological:      General: No focal deficit present.      Mental Status: He is alert and oriented to person, place, and time.   Psychiatric:         Mood and Affect: Mood normal.           Results     Lab Results   Component Value Date    WBC 6.20 05/20/2024    RBC 4.56 (L) 05/20/2024    HGB 14.1 05/20/2024    HCT 39.4 (L) 05/20/2024    MCV 86.4 05/20/2024    MCH 30.9 05/20/2024    MCHC 35.8 05/20/2024    RDW 12.0 05/20/2024     05/20/2024    MPV 10.2 05/20/2024      Lab Results   Component Value Date     05/20/2024    K 3.7 05/20/2024    CO2 26 05/20/2024    GLUCOSE 95 05/20/2024    BUN 11.3 05/20/2024    CREATININE 0.87 05/20/2024    LABPROT 7.4 05/20/2024    ALBUMIN 3.9 05/20/2024    BILITOT 0.4 05/20/2024    ALKPHOS 67 05/20/2024    AST 18 05/20/2024    ALT 26 05/20/2024    AGAP 7.0 05/20/2024    EGFRNORACEVR >60 05/20/2024     Lab Results   Component Value Date    TSH 0.497 05/20/2024     Lab Results   Component Value Date    CHOL 174 05/20/2024    HDL 44 05/20/2024    .00 05/20/2024    TRIG 88  05/20/2024     Lab Results   Component Value Date    SGUA 1.022 05/20/2024    PROTEINUA Negative 05/20/2024    BILIRUBINUA Negative 05/20/2024    WBCUA 0-5 05/20/2024    RBCUA 0-5 05/20/2024    BACTERIA Trace (A) 05/20/2024    LEUKOCYTESUR Negative 05/20/2024    UROBILINOGEN Normal 05/20/2024     Lab Results   Component Value Date    CREATRANDUR 164.6 05/20/2024    MICALBCREAT 6.3 05/20/2024     Lab Results   Component Value Date    NEJYKNJW75PK 19 (L) 05/20/2024     Lab Results   Component Value Date    HIV Nonreactive 05/20/2024    HEPAIGM Nonreactive 05/20/2024    HEPBSAG Nonreactive 05/20/2024    HEPCAB Nonreactive 05/20/2024     Assessment and Plan (including Health Maintenance)     Problem List Items Addressed This Visit          Neuro    Cervical radiculopathy    Current Assessment & Plan     Continue gabapentin as prescribed - refilled today.  11/22/2023-MRI cervical spine w/o contrast reveals mild degenerative changes in the cervical spine without spinal canal stenosis or nerve root impingement identified.  Patient is currently a self pay, will apply for medicaid, if approved, will then refer to [facility] neurosurgery.         Relevant Medications    gabapentin (NEURONTIN) 300 MG capsule       Endocrine    BMI 28.0-28.9,adult    Current Assessment & Plan     Body mass index is 28.46 kg/m². (At goal).          H/O hyperthyroidism    Current Assessment & Plan      Latest Reference Range & Units 05/20/24 09:45   TSH 0.350 - 4.940 uIU/mL 0.497   T3, Free 1.58 - 3.91 pg/mL 3.63   Free T4 0.70 - 1.48 ng/dL 0.87   Thyroperoxidase Antibodies <25 IU/mL <4     All studies WNL.         Relevant Orders    TSH    T4, Free    Vitamin D deficiency    Current Assessment & Plan     Lab Results   Component Value Date    [id number] 19 (L) 05/20/2024     Educated on increasing foods high in Vitamin D such as fish oil, cod liver oil, salmon, milk fortified with vitamin D.  Rx Vitamin D3 00248 iu weekly.  Once complete with  weekly vitamin D, take vitamin D 2000 iu daily - purchase OTC.  Repeat Vitamin D level as ordered.           Relevant Medications    cholecalciferol, vitamin D3, 1,250 mcg (50,000 unit) capsule    Other Relevant Orders    Vitamin D       Other    Well adult exam - Primary    Current Assessment & Plan     Wellness labs - 5/02/2024  Prostate Cancer Screening - Last PSA - 2.09 on 5/20/2024. Follow up annually.   Colon Cancer Screening - Deferred due to age.   Osteoporosis Screening - Deferred due to age.   Eye Exam -Several years. List of local eye doctors given to patient.   Dental Exam - Several years. List of local dentists given to patient.   Vaccinations: Flu - 12/5/2023 / Tetanus - Declines         Relevant Orders    Urinalysis, Reflex to Urine Culture    Microalbumin/Creatinine Ratio, Urine    Comprehensive Metabolic Panel    CBC Auto Differential     Other Visit Diagnoses       Screening for iron deficiency anemia        Relevant Orders    Iron and TIBC    Ferritin    Screening for lipid disorders        Relevant Orders    Lipid Panel    Screening for diabetes mellitus        Relevant Orders    Hemoglobin A1C    Screening for prostate cancer        Relevant Orders    PSA, Screening               Health Maintenance Due   Topic Date Due    COVID-19 Vaccine (1 - 2023-24 season) Never done     All of your core healthy metrics are met.      Health Maintenance Topics with due status: Not Due       Topic Last Completion Date    Influenza Vaccine 12/05/2023    Hemoglobin A1c (Diabetic Prevention Screening) 05/20/2024    Lipid Panel 05/20/2024       Future Appointments   Date Time Provider Department Center   9/4/2024  2:00 PM Vu, Laterica L, FNP [facility]   7/23/2025 12:00 PM [facility] Branch [facility]        Follow up in about 1 year (around 7/23/2025) for F2F, Wellness, Follow up, Med check, Lab review, RTC PRN.          Signature:        Shanna C Burrell, FNP [facility]  [address] [facility] [id number]    Date of encounter: 7/23/24

## 2024-07-23 NOTE — ASSESSMENT & PLAN NOTE
Latest Reference Range & Units 05/20/24 09:45   TSH 0.350 - 4.940 uIU/mL 0.497   T3, Free 1.58 - 3.91 pg/mL 3.63   Free T4 0.70 - 1.48 ng/dL 0.87   Thyroperoxidase Antibodies <25 IU/mL <4     All studies WNL.

## 2024-07-23 NOTE — ASSESSMENT & PLAN NOTE
Lab Results   Component Value Date    EKXEFUMF45MG 19 (L) 05/20/2024     Educated on increasing foods high in Vitamin D such as fish oil, cod liver oil, salmon, milk fortified with vitamin D.  Rx Vitamin D3 92827 iu weekly.  Once complete with weekly vitamin D, take vitamin D 2000 iu daily - purchase OTC.  Repeat Vitamin D level as ordered.

## 2024-09-02 PROBLEM — Z00.00 WELL ADULT EXAM: Status: RESOLVED | Noted: 2024-05-01 | Resolved: 2024-09-02

## 2024-09-03 NOTE — PROGRESS NOTES
CHIEF COMPLAINT:   Chief Complaint   Patient presents with    Follow-up     Denies any cardiac problems                                                  HPI:  Bro Koo Jr. 39 y.o. male with headaches, thyroid disease (hyperthyroid took radiation pill in 2006), anxiety who presents to cardiology clinic for routine follow up and results of testing.  Last seen as initial referral for chest pain.  The patient previously follow with [facility] in [place].  The patient wore a monitor for 1 week due to palpitations which demonstrated occasional PACs and PVCs.  At last clinic visit, he endorsed occasional squeezing chest pain that occurred with stress or physical exertion with associated shortness of breath and palpitations.     Subsequent Treadmill Stress Test completed on 5.20.24 indicated low risk for cardiovascular events based on Duke score. (See report below).     Patient presents to clinic today reporting one episode of mid-sternal squeezing chest pain that occurred during intercourse but denies any other episodes.  He states that the pain lasted less than a minute and was relieved with rest.  He is also experiencing occasional episodes transient palpitations and shortness of breath that occurs mostly with exertion or increased stress.  Notably, the patient reports that his overall physical activity has drastically decreased due to ongoing neuropathic issues.  He was reportedly very physically active at one point but is now admittedly deconditioned.  However, the patient states whenever he was not stressed, he does not experience any cardiac symptoms.                                                                                                                                                                                                                                                                                                     CARDIAC TESTING:      Exercise Stress - EKG 5.20.24    Interpretation  Summary    The patient reported no chest pain during the stress test.    There were no arrhythmias during stress.    The patient exercised for 6 minutes 56 seconds on a Jeevan protocol, corresponding to a functional capacity of 10METS, achieving a peak heart rate of 187 bpm, which is 102% of the age predicted maximum heart rate.    Patient exceeded target heart rate  Minutes exercised:  6 min 56 sec  Adjusted ST deviation:  0 mm  Angina:  no angina (0)  Duke treadmill score (Min - ST - Index):  7.  Patient's Score:  >5.    The patient's risk for cardiovascular events is LOW (based on Paz Score)            Patient Active Problem List   Diagnosis    Paresthesias in left hand    Pain in both upper extremities    Paresthesias in right hand    BMI 28.0-28.9,adult    Cervical radiculopathy    Chest pain    H/O hyperthyroidism    Vitamin D deficiency     History reviewed. No pertinent surgical history.  Social History     Socioeconomic History    Marital status:    Tobacco Use    Smoking status: Never    Smokeless tobacco: Never   Substance and Sexual Activity    Alcohol use: Yes     Alcohol/week: 1.0 standard drink of alcohol     Types: 1 Glasses of wine per week     Comment: Very very infrequent    Drug use: Never    Sexual activity: Yes     Partners: Female     Birth control/protection: Condom     Social Determinants of Health     Financial Resource Strain: Low Risk  (5/1/2024)    Overall Financial Resource Strain (CARDIA)     Difficulty of Paying Living Expenses: Not very hard   Food Insecurity: Food Insecurity Present (5/1/2024)    Hunger Vital Sign     Worried About Running Out of Food in the Last Year: Never true     Ran Out of Food in the Last Year: Sometimes true   Transportation Needs: No Transportation Needs (5/1/2024)    PRAPARE - Transportation     Lack of Transportation (Medical): No     Lack of Transportation (Non-Medical): No   Physical Activity: Insufficiently Active (5/1/2024)    Exercise Vital  "Sign     Days of Exercise per Week: 4 days     Minutes of Exercise per Session: 30 min   Stress: Stress Concern Present (5/1/2024)    [employer] - Occupational Stress Questionnaire     Feeling of Stress : To some extent   Housing Stability: Unknown (5/1/2024)    Housing Stability Vital Sign     Unable to Pay for Housing in the Last Year: No        Family History   Problem Relation Name Age of Onset    Hypertension Mother Yajaira     Arthritis Mother Yajaira     Depression Mother Yajaira     Hypertension Father      Arthritis Maternal Grandfather Oscar     Cancer Maternal Grandfather Oscar     Arthritis Maternal Grandmother geraldine     Birth defects Maternal Grandmother geraldine     Depression Maternal Grandmother geraldine     Hyperlipidemia Maternal Grandmother geraldine     Stroke Maternal Grandmother geraldine     Alcohol abuse Maternal Uncle Lamberto      Review of patient's allergies indicates:   Allergen Reactions    Meclizine Other (See Comments)     Severe mood swings         ROS:  Review of Systems   Constitutional: Negative.    HENT: Negative.     Eyes: Negative.    Respiratory: Negative.  Negative for shortness of breath.    Cardiovascular: Negative.    Gastrointestinal: Negative.    Genitourinary: Negative.    Musculoskeletal: Negative.    Skin: Negative.    Neurological: Negative.    Endo/Heme/Allergies: Negative.    Psychiatric/Behavioral: Negative.                                                                                                                                                                                  Negative except as stated in the history of present illness. See HPI for details.    PHYSICAL EXAM:  Visit Vitals  /81 (BP Location: Right arm, Patient Position: Sitting, BP Method: Large (Automatic))   Pulse 62   Temp 97.6 °F (36.4 °C) (Oral)   Resp 20   Ht 5' 8" (1.727 m)   Wt 86.8 kg (191 lb 6.4 oz)   SpO2 100%   BMI 29.10 kg/m²       Physical Exam  Constitutional:       " Appearance: Normal appearance.   HENT:      Head: Normocephalic.   Eyes:      Pupils: Pupils are equal, round, and reactive to light.   Cardiovascular:      Rate and Rhythm: Normal rate and regular rhythm.      Pulses: Normal pulses.   Pulmonary:      Effort: Pulmonary effort is normal.   Musculoskeletal:         General: Normal range of motion.   Skin:     General: Skin is warm and dry.   Neurological:      General: No focal deficit present.      Mental Status: He is alert and oriented to person, place, and time.   Psychiatric:         Mood and Affect: Mood normal.         Behavior: Behavior normal.         Current Outpatient Medications   Medication Instructions    cholecalciferol (vitamin D3) 50,000 Units, Oral, Every 7 days    gabapentin (NEURONTIN) 300 mg, Oral, Nightly        All medications, laboratory studies, cardiac diagnostic imaging reviewed.     Lab Results   Component Value Date    .00 05/20/2024    TRIG 88 05/20/2024    CREATININE 0.87 05/20/2024    K 3.7 05/20/2024        ASSESSMENT/PLAN:  Chest pain - Atypical   Palpitations  - Treadmill Stress Test - low risk for cardiovascular events based on Duke score (5.20.24)  - [facility] chart reviewed, patient had monitor which revealed occasional PACs.  No other significant findings.  - Chest pain symptoms are atypical. Patient experienced one episode since last visit. Denies any cardiac symptoms whenever he is not stress     History of hyperthyroidism  - Patient states he took a radiation pill in 2006  - Not currently on thyroid medication.  -TSH from 03/16/2024 was 0.496.  Within normal limits.        Follow up in Cardiology Clinic in 4 months or sooner if needed  Follow up with PCP as directed

## 2024-09-04 ENCOUNTER — OFFICE VISIT (OUTPATIENT)
Dept: CARDIOLOGY | Facility: CLINIC | Age: 39
End: 2024-09-04

## 2024-09-04 VITALS
HEIGHT: 68 IN | TEMPERATURE: 98 F | RESPIRATION RATE: 20 BRPM | DIASTOLIC BLOOD PRESSURE: 81 MMHG | SYSTOLIC BLOOD PRESSURE: 106 MMHG | BODY MASS INDEX: 29.01 KG/M2 | WEIGHT: 191.38 LBS | OXYGEN SATURATION: 100 % | HEART RATE: 62 BPM

## 2024-09-04 DIAGNOSIS — E55.9 VITAMIN D DEFICIENCY: Primary | ICD-10-CM

## 2024-09-04 DIAGNOSIS — R07.89 OTHER CHEST PAIN: ICD-10-CM

## 2024-09-04 DIAGNOSIS — Z86.39 H/O HYPERTHYROIDISM: ICD-10-CM

## 2024-09-04 PROCEDURE — 99214 OFFICE O/P EST MOD 30 MIN: CPT | Mod: S$PBB,,, | Performed by: NURSE PRACTITIONER

## 2024-09-04 PROCEDURE — 99214 OFFICE O/P EST MOD 30 MIN: CPT | Mod: PBBFAC | Performed by: NURSE PRACTITIONER

## 2024-09-04 NOTE — PATIENT INSTRUCTIONS
-- DO NOT REPLY / DO NOT REPLY ALL --  -- Message is from the [facility]--    General Patient Message      Reason for Call: pATIENTS MOM RETURNED CALL PLEASE CONTACT AGAIN    Caller Information       Type Contact Phone    09/29/2021 03:54 PM CDT Phone (Incoming) GERRY CHRISTENSEN (Mother) [phone] (M)          Alternative phone number: NONE    Turnaround time given to caller:   \"This message will be sent to [state Provider's name]. The clinical team will fulfill your request as soon as they review your message.\"     Follow up in Cardiology Clinic in 4 months or sooner if needed  Follow up with PCP as directed

## 2024-09-06 ENCOUNTER — TELEPHONE (OUTPATIENT)
Dept: INTERNAL MEDICINE | Facility: CLINIC | Age: 39
End: 2024-09-06

## 2025-01-07 ENCOUNTER — OFFICE VISIT (OUTPATIENT)
Dept: CARDIOLOGY | Facility: CLINIC | Age: 40
End: 2025-01-07
Payer: MEDICAID

## 2025-01-07 VITALS
OXYGEN SATURATION: 98 % | RESPIRATION RATE: 20 BRPM | DIASTOLIC BLOOD PRESSURE: 100 MMHG | HEART RATE: 83 BPM | TEMPERATURE: 98 F | WEIGHT: 180.38 LBS | SYSTOLIC BLOOD PRESSURE: 136 MMHG | HEIGHT: 68 IN | BODY MASS INDEX: 27.34 KG/M2

## 2025-01-07 DIAGNOSIS — R07.89 ATYPICAL CHEST PAIN: Primary | ICD-10-CM

## 2025-01-07 PROCEDURE — 99215 OFFICE O/P EST HI 40 MIN: CPT | Mod: PBBFAC | Performed by: INTERNAL MEDICINE

## 2025-01-07 RX ORDER — FLUTICASONE PROPIONATE 50 MCG
1 SPRAY, SUSPENSION (ML) NASAL
COMMUNITY
Start: 2024-10-26

## 2025-01-07 RX ORDER — PROMETHAZINE HYDROCHLORIDE AND DEXTROMETHORPHAN HYDROBROMIDE 6.25; 15 MG/5ML; MG/5ML
5 SYRUP ORAL EVERY 6 HOURS PRN
COMMUNITY
Start: 2024-10-26

## 2025-01-07 NOTE — PROGRESS NOTES
CHIEF COMPLAINT:   Chief Complaint   Patient presents with    f/u states has chest discomfort and sob weekly questions on                                                  HPI:  Bro Koo Jr. 39 y.o. male with headaches, thyroid disease (hyperthyroid took radiation pill in 2006), anxiety who presents to cardiology clinic for routine follow up and results of testing.  Last seen as initial referral for chest pain.  The patient previously follow with [facility] in [place].  The patient wore a monitor for 1 week due to palpitations which demonstrated occasional PACs and PVCs.  At last clinic visit, he endorsed occasional squeezing chest pain that occurred with stress or physical exertion with associated shortness of breath and palpitations.     Subsequent Treadmill Stress Test completed on 5.20.24 indicated low risk for cardiovascular events based on Duke score. (See report below).     09/04/2024: Patient presents to clinic today reporting one episode of mid-sternal squeezing chest pain that occurred during intercourse but denies any other episodes.  He states that the pain lasted less than a minute and was relieved with rest.  He is also experiencing occasional episodes transient palpitations and shortness of breath that occurs mostly with exertion or increased stress.  Notably, the patient reports that his overall physical activity has drastically decreased due to ongoing neuropathic issues.  He was reportedly very physically active at one point but is now admittedly deconditioned.  However, the patient states whenever he was not stressed, he does not experience any cardiac symptoms.     Todays visit: Patient stated that he does not have chest pain similar to what he had in the past just some chest tightness more so a little discomfort on excessive physical activity since he started working recently. The symptoms could be attributed to poor endurance, per patient he started working after a long time and is  slowly getting better and he stated that he do have dizzy feeling sometimes with change in position, BP has been stable on the last couple of readings. He is also on gabapentin which might also be adding to his symptoms.                                                                                                                                                                                                                                                                                                  CARDIAC TESTING:      Exercise Stress - EKG 5.20.24    Interpretation Summary    The patient reported no chest pain during the stress test.    There were no arrhythmias during stress.    The patient exercised for 6 minutes 56 seconds on a Jeevan protocol, corresponding to a functional capacity of 10METS, achieving a peak heart rate of 187 bpm, which is 102% of the age predicted maximum heart rate.    Patient exceeded target heart rate  Minutes exercised:  6 min 56 sec  Adjusted ST deviation:  0 mm  Angina:  no angina (0)  Duke treadmill score (Min - ST - Index):  7.  Patient's Score:  >5.    The patient's risk for cardiovascular events is LOW (based on Paz Score)            Patient Active Problem List   Diagnosis    Paresthesias in left hand    Pain in both upper extremities    Paresthesias in right hand    BMI 28.0-28.9,adult    Cervical radiculopathy    Chest pain    H/O hyperthyroidism    Vitamin D deficiency     History reviewed. No pertinent surgical history.  Social History     Socioeconomic History    Marital status:    Tobacco Use    Smoking status: Never    Smokeless tobacco: Never   Substance and Sexual Activity    Alcohol use: Yes     Alcohol/week: 1.0 standard drink of alcohol     Types: 1 Glasses of wine per week     Comment: Very very infrequent    Drug use: Never    Sexual activity: Yes     Partners: Female     Birth control/protection: Condom     Social Drivers of Health     Financial Resource  Strain: Low Risk  (5/1/2024)    Overall Financial Resource Strain (CARDIA)     Difficulty of Paying Living Expenses: Not very hard   Food Insecurity: Food Insecurity Present (5/1/2024)    Hunger Vital Sign     Worried About Running Out of Food in the Last Year: Never true     Ran Out of Food in the Last Year: Sometimes true   Transportation Needs: No Transportation Needs (5/1/2024)    PRAPARE - Transportation     Lack of Transportation (Medical): No     Lack of Transportation (Non-Medical): No   Physical Activity: Insufficiently Active (5/1/2024)    Exercise Vital Sign     Days of Exercise per Week: 4 days     Minutes of Exercise per Session: 30 min   Stress: Stress Concern Present (5/1/2024)    [employer] - Occupational Stress Questionnaire     Feeling of Stress : To some extent   Housing Stability: Unknown (5/1/2024)    Housing Stability Vital Sign     Unable to Pay for Housing in the Last Year: No        Family History   Problem Relation Name Age of Onset    Hypertension Mother Yajaira     Arthritis Mother Yajaira     Depression Mother Yajaira     Hypertension Father      Arthritis Maternal Grandfather Vincrico     Cancer Maternal Grandfather Vincent     Arthritis Maternal Grandmother geraldine     Birth defects Maternal Grandmother geraldine     Depression Maternal Grandmother geraldine     Hyperlipidemia Maternal Grandmother geraldine     Stroke Maternal Grandmother geraldine     Alcohol abuse Maternal Uncle Lamberto      Review of patient's allergies indicates:   Allergen Reactions    Meclizine Other (See Comments)     Severe mood swings         ROS:  Review of Systems   Constitutional: Negative.    HENT: Negative.     Eyes: Negative.    Respiratory: Negative.  Negative for shortness of breath.    Cardiovascular: Negative.    Gastrointestinal: Negative.    Genitourinary: Negative.    Musculoskeletal: Negative.    Skin: Negative.    Neurological: Negative.    Endo/Heme/Allergies: Negative.    Psychiatric/Behavioral:  "Negative.                                                                                                                                                                                  Negative except as stated in the history of present illness. See HPI for details.    PHYSICAL EXAM:  Visit Vitals  /68 (BP Location: Left arm, Patient Position: Sitting)   Pulse (!) 54   Temp 98.4 °F (36.9 °C) (Oral)   Resp 20   Ht 5' 8" (1.727 m)   Wt 81.8 kg (180 lb 6.4 oz)   SpO2 98%   BMI 27.43 kg/m²       Physical Exam  Constitutional:       Appearance: Normal appearance.   HENT:      Head: Normocephalic.   Eyes:      Pupils: Pupils are equal, round, and reactive to light.   Cardiovascular:      Rate and Rhythm: Normal rate and regular rhythm.      Pulses: Normal pulses.   Pulmonary:      Effort: Pulmonary effort is normal.   Musculoskeletal:         General: Normal range of motion.   Skin:     General: Skin is warm and dry.   Neurological:      General: No focal deficit present.      Mental Status: He is alert and oriented to person, place, and time.   Psychiatric:         Mood and Affect: Mood normal.         Behavior: Behavior normal.         Current Outpatient Medications   Medication Instructions    cholecalciferol (vitamin D3) 50,000 Units, Oral, Every 7 days    fluticasone propionate (FLONASE) 50 mcg/actuation nasal spray 1 spray, As needed (PRN)    gabapentin (NEURONTIN) 300 mg, Oral, Nightly    promethazine-dextromethorphan (PROMETHAZINE-DM) 6.25-15 mg/5 mL Syrp 5 mLs, Every 6 hours PRN        All medications, laboratory studies, cardiac diagnostic imaging reviewed.     Lab Results   Component Value Date    .00 05/20/2024    TRIG 88 05/20/2024    CREATININE 0.87 05/20/2024    K 3.7 05/20/2024     ASSESSMENT/PLAN:  Chest pain - Atypical   - Treadmill Stress Test - low risk for cardiovascular events based on Duke score (5.20.24)  - [facility] chart reviewed in before visits, patient had monitor which revealed " occasional PACs.  No other significant findings.  - Patient stated that he did not have any more episode of chest pain like he used to have in the past just some chest tightness more so a discomfort occasionally , when he is working a lot and  he reported the onset of symptoms since he started working recently, which can be attributed to poor endurance than cardiac related   - he reported that he do have some dizziness with change in position, orthostatic vitals showed no drop in systolic or diastolic   - follow up in cardiology clinic as needed, if his symptoms were to worsen     History of hyperthyroidism  - Patient states he took a radiation pill in 2006  - Not currently on thyroid medication.  -TSH from 03/16/2024 was 0.496.  Within normal limits.        Follow up in Cardiology Clinic  if needed  Follow up with PCP as directed     SADIQ WEBER MD  Internal Medicine - PGY-1

## 2025-07-29 ENCOUNTER — OFFICE VISIT (OUTPATIENT)
Dept: INTERNAL MEDICINE | Facility: CLINIC | Age: 40
End: 2025-07-29
Payer: MEDICAID

## 2025-07-29 ENCOUNTER — LAB VISIT (OUTPATIENT)
Dept: LAB | Facility: HOSPITAL | Age: 40
End: 2025-07-29
Payer: MEDICAID

## 2025-07-29 VITALS
SYSTOLIC BLOOD PRESSURE: 121 MMHG | DIASTOLIC BLOOD PRESSURE: 82 MMHG | RESPIRATION RATE: 17 BRPM | OXYGEN SATURATION: 98 % | TEMPERATURE: 98 F | HEIGHT: 68 IN | BODY MASS INDEX: 26.58 KG/M2 | WEIGHT: 175.38 LBS | HEART RATE: 73 BPM

## 2025-07-29 DIAGNOSIS — Z12.5 SCREENING FOR PROSTATE CANCER: ICD-10-CM

## 2025-07-29 DIAGNOSIS — R21 SKIN ERUPTION: ICD-10-CM

## 2025-07-29 DIAGNOSIS — Z23 IMMUNIZATION DUE: ICD-10-CM

## 2025-07-29 DIAGNOSIS — D75.89 MACROCYTOSIS: ICD-10-CM

## 2025-07-29 DIAGNOSIS — Z13.220 SCREENING FOR LIPID DISORDERS: ICD-10-CM

## 2025-07-29 DIAGNOSIS — Z00.00 WELL ADULT EXAM: Primary | ICD-10-CM

## 2025-07-29 DIAGNOSIS — Z13.0 SCREENING FOR IRON DEFICIENCY ANEMIA: ICD-10-CM

## 2025-07-29 DIAGNOSIS — Z86.39 H/O HYPERTHYROIDISM: ICD-10-CM

## 2025-07-29 DIAGNOSIS — Z00.00 WELL ADULT EXAM: ICD-10-CM

## 2025-07-29 DIAGNOSIS — E55.9 VITAMIN D INSUFFICIENCY: ICD-10-CM

## 2025-07-29 DIAGNOSIS — Z13.1 SCREENING FOR DIABETES MELLITUS: ICD-10-CM

## 2025-07-29 DIAGNOSIS — E55.9 VITAMIN D DEFICIENCY: ICD-10-CM

## 2025-07-29 LAB
25(OH)D3+25(OH)D2 SERPL-MCNC: 25 NG/ML (ref 30–80)
ALBUMIN SERPL-MCNC: 4.4 G/DL (ref 3.5–5)
ALBUMIN/CREAT UR: 5.6 MG/GM CR (ref 0–30)
ALBUMIN/GLOB SERPL: 1.2 RATIO (ref 1.1–2)
ALP SERPL-CCNC: 69 UNIT/L (ref 40–150)
ALT SERPL-CCNC: 19 UNIT/L (ref 0–55)
ANION GAP SERPL CALC-SCNC: 9 MEQ/L
AST SERPL-CCNC: 18 UNIT/L (ref 11–45)
BACTERIA #/AREA URNS AUTO: ABNORMAL /HPF
BASOPHILS # BLD AUTO: 0.06 X10(3)/MCL
BASOPHILS NFR BLD AUTO: 1 %
BILIRUB SERPL-MCNC: 0.5 MG/DL
BILIRUB UR QL STRIP.AUTO: NEGATIVE
BUN SERPL-MCNC: 9.8 MG/DL (ref 8.9–20.6)
CALCIUM SERPL-MCNC: 9.2 MG/DL (ref 8.4–10.2)
CHLORIDE SERPL-SCNC: 109 MMOL/L (ref 98–107)
CHOLEST SERPL-MCNC: 158 MG/DL
CHOLEST/HDLC SERPL: 3 {RATIO} (ref 0–5)
CLARITY UR: CLEAR
CO2 SERPL-SCNC: 25 MMOL/L (ref 22–29)
COLOR UR AUTO: ABNORMAL
CREAT SERPL-MCNC: 0.83 MG/DL (ref 0.72–1.25)
CREAT UR-MCNC: 116.5 MG/DL (ref 63–166)
CREAT/UREA NIT SERPL: 12
EOSINOPHIL # BLD AUTO: 0.07 X10(3)/MCL (ref 0–0.9)
EOSINOPHIL NFR BLD AUTO: 1.2 %
ERYTHROCYTE [DISTWIDTH] IN BLOOD BY AUTOMATED COUNT: 11.9 % (ref 11.5–17)
EST. AVERAGE GLUCOSE BLD GHB EST-MCNC: 91.1 MG/DL
FERRITIN SERPL-MCNC: 137.25 NG/ML (ref 21.81–274.66)
GFR SERPLBLD CREATININE-BSD FMLA CKD-EPI: >60 ML/MIN/1.73/M2
GLOBULIN SER-MCNC: 3.7 GM/DL (ref 2.4–3.5)
GLUCOSE SERPL-MCNC: 91 MG/DL (ref 74–100)
GLUCOSE UR QL STRIP: NORMAL
HBA1C MFR BLD: 4.8 %
HCT VFR BLD AUTO: 42.1 % (ref 42–52)
HDLC SERPL-MCNC: 57 MG/DL (ref 35–60)
HGB BLD-MCNC: 15.2 G/DL (ref 14–18)
HGB UR QL STRIP: ABNORMAL
HYALINE CASTS #/AREA URNS LPF: ABNORMAL /LPF
IMM GRANULOCYTES # BLD AUTO: 0.01 X10(3)/MCL (ref 0–0.04)
IMM GRANULOCYTES NFR BLD AUTO: 0.2 %
IRON SATN MFR SERPL: 26 % (ref 20–50)
IRON SERPL-MCNC: 80 UG/DL (ref 65–175)
KETONES UR QL STRIP: NEGATIVE
LDLC SERPL CALC-MCNC: 81 MG/DL (ref 50–140)
LEUKOCYTE ESTERASE UR QL STRIP: NEGATIVE
LYMPHOCYTES # BLD AUTO: 1.5 X10(3)/MCL (ref 0.6–4.6)
LYMPHOCYTES NFR BLD AUTO: 25.9 %
MCH RBC QN AUTO: 31.8 PG (ref 27–31)
MCHC RBC AUTO-ENTMCNC: 36.1 G/DL (ref 33–36)
MCV RBC AUTO: 88.1 FL (ref 80–94)
MICROALBUMIN UR-MCNC: 6.5 UG/ML
MONOCYTES # BLD AUTO: 0.57 X10(3)/MCL (ref 0.1–1.3)
MONOCYTES NFR BLD AUTO: 9.8 %
MUCOUS THREADS URNS QL MICRO: ABNORMAL /LPF
NEUTROPHILS # BLD AUTO: 3.58 X10(3)/MCL (ref 2.1–9.2)
NEUTROPHILS NFR BLD AUTO: 61.9 %
NITRITE UR QL STRIP: NEGATIVE
NRBC BLD AUTO-RTO: 0 %
PH UR STRIP: 6.5 [PH]
PLATELET # BLD AUTO: 305 X10(3)/MCL (ref 130–400)
PMV BLD AUTO: 11 FL (ref 7.4–10.4)
POTASSIUM SERPL-SCNC: 3.6 MMOL/L (ref 3.5–5.1)
PROT SERPL-MCNC: 8.1 GM/DL (ref 6.4–8.3)
PROT UR QL STRIP: NEGATIVE
PSA SERPL-MCNC: 1.4 NG/ML
RBC # BLD AUTO: 4.78 X10(6)/MCL (ref 4.7–6.1)
RBC #/AREA URNS AUTO: ABNORMAL /HPF
SODIUM SERPL-SCNC: 143 MMOL/L (ref 136–145)
SP GR UR STRIP.AUTO: 1.02 (ref 1–1.03)
SQUAMOUS #/AREA URNS LPF: ABNORMAL /HPF
T4 FREE SERPL-MCNC: 1.03 NG/DL (ref 0.7–1.48)
TIBC SERPL-MCNC: 225 UG/DL (ref 60–240)
TIBC SERPL-MCNC: 305 UG/DL (ref 250–450)
TRANSFERRIN SERPL-MCNC: 265 MG/DL (ref 174–364)
TRIGL SERPL-MCNC: 99 MG/DL (ref 34–140)
TSH SERPL-ACNC: 0.58 UIU/ML (ref 0.35–4.94)
UROBILINOGEN UR STRIP-ACNC: NORMAL
VLDLC SERPL CALC-MCNC: 20 MG/DL
WBC # BLD AUTO: 5.79 X10(3)/MCL (ref 4.5–11.5)
WBC #/AREA URNS AUTO: ABNORMAL /HPF

## 2025-07-29 PROCEDURE — 82043 UR ALBUMIN QUANTITATIVE: CPT

## 2025-07-29 PROCEDURE — 86008 ALLG SPEC IGE RECOMB EA: CPT | Mod: 59

## 2025-07-29 PROCEDURE — 84153 ASSAY OF PSA TOTAL: CPT

## 2025-07-29 PROCEDURE — 3008F BODY MASS INDEX DOCD: CPT | Mod: CPTII,,,

## 2025-07-29 PROCEDURE — 99395 PREV VISIT EST AGE 18-39: CPT | Mod: 25,S$PBB,,

## 2025-07-29 PROCEDURE — 81001 URINALYSIS AUTO W/SCOPE: CPT

## 2025-07-29 PROCEDURE — 80061 LIPID PANEL: CPT

## 2025-07-29 PROCEDURE — 82306 VITAMIN D 25 HYDROXY: CPT

## 2025-07-29 PROCEDURE — 82785 ASSAY OF IGE: CPT

## 2025-07-29 PROCEDURE — 84443 ASSAY THYROID STIM HORMONE: CPT

## 2025-07-29 PROCEDURE — 84439 ASSAY OF FREE THYROXINE: CPT

## 2025-07-29 PROCEDURE — 1159F MED LIST DOCD IN RCRD: CPT | Mod: CPTII,,,

## 2025-07-29 PROCEDURE — 90715 TDAP VACCINE 7 YRS/> IM: CPT | Mod: PBBFAC

## 2025-07-29 PROCEDURE — 86003 ALLG SPEC IGE CRUDE XTRC EA: CPT | Mod: 59

## 2025-07-29 PROCEDURE — 86003 ALLG SPEC IGE CRUDE XTRC EA: CPT

## 2025-07-29 PROCEDURE — 36415 COLL VENOUS BLD VENIPUNCTURE: CPT

## 2025-07-29 PROCEDURE — 3074F SYST BP LT 130 MM HG: CPT | Mod: CPTII,,,

## 2025-07-29 PROCEDURE — 3044F HG A1C LEVEL LT 7.0%: CPT | Mod: CPTII,,,

## 2025-07-29 PROCEDURE — 82728 ASSAY OF FERRITIN: CPT

## 2025-07-29 PROCEDURE — 86008 ALLG SPEC IGE RECOMB EA: CPT

## 2025-07-29 PROCEDURE — 1160F RVW MEDS BY RX/DR IN RCRD: CPT | Mod: CPTII,,,

## 2025-07-29 PROCEDURE — 3079F DIAST BP 80-89 MM HG: CPT | Mod: CPTII,,,

## 2025-07-29 PROCEDURE — 99214 OFFICE O/P EST MOD 30 MIN: CPT | Mod: S$PBB,,,

## 2025-07-29 PROCEDURE — 90471 IMMUNIZATION ADMIN: CPT | Mod: PBBFAC

## 2025-07-29 PROCEDURE — 80053 COMPREHEN METABOLIC PANEL: CPT

## 2025-07-29 PROCEDURE — 83550 IRON BINDING TEST: CPT

## 2025-07-29 PROCEDURE — 85025 COMPLETE CBC W/AUTO DIFF WBC: CPT

## 2025-07-29 PROCEDURE — 83036 HEMOGLOBIN GLYCOSYLATED A1C: CPT

## 2025-07-29 PROCEDURE — 82785 ASSAY OF IGE: CPT | Mod: 59

## 2025-07-29 PROCEDURE — 99214 OFFICE O/P EST MOD 30 MIN: CPT | Mod: PBBFAC

## 2025-07-29 RX ORDER — HYDROCORTISONE 25 MG/G
CREAM TOPICAL 2 TIMES DAILY
Qty: 28 G | Refills: 1 | Status: SHIPPED | OUTPATIENT
Start: 2025-07-29 | End: 2026-01-25

## 2025-07-29 RX ORDER — PREDNISONE 20 MG/1
20 TABLET ORAL 2 TIMES DAILY
Qty: 6 TABLET | Refills: 0 | Status: SHIPPED | OUTPATIENT
Start: 2025-07-29 | End: 2025-08-01

## 2025-07-29 RX ADMIN — CLOSTRIDIUM TETANI TOXOID ANTIGEN (FORMALDEHYDE INACTIVATED), CORYNEBACTERIUM DIPHTHERIAE TOXOID ANTIGEN (FORMALDEHYDE INACTIVATED), BORDETELLA PERTUSSIS TOXOID ANTIGEN (GLUTARALDEHYDE INACTIVATED), BORDETELLA PERTUSSIS FILAMENTOUS HEMAGGLUTININ ANTIGEN (FORMALDEHYDE INACTIVATED), BORDETELLA PERTUSSIS PERTACTIN ANTIGEN, AND BORDETELLA PERTUSSIS FIMBRIAE 2/3 ANTIGEN 0.5 ML: 5; 2; 2.5; 5; 3; 5 INJECTION, SUSPENSION INTRAMUSCULAR at 01:07

## 2025-07-29 NOTE — PROGRESS NOTES
PATIENT NAME: Bro Koo Jr.  : 1985  DATE: 25  MRN: [id number]          Reason for Visit/Chief Complaint   Annual Exam and Results       History of Present Illness (HPI)     Bro Koo Jr. is a 40 y.o. White male presenting in clinic today for an Annual Exam and Results. Chronic conditions: kidney stones, headaches, thyroid disease (hyperthyroid took radiation pill in ), anxiety. Previously followed by Dr. Luis Berger (neurologist) for headaches. He is currently followed by Dr. Rhona Schmidt ([facility] - chiropractor) only doing traction. EMG done with Dr. Devan Preston - [facility].     Followed by:  [facility]ology - last office visit on 2024. Patient's wife, Verenice, is present during visit. On 2024-patient had Exercise Stress Test. Deemed low risk based on Paz score.     Patient presents today for follow up of a rash. He reports a widespread rash currently covering his entire body, including legs and feet. He describes the rash as severe and notes a similar episode occurred approximately one year ago. He indicates potential exposure to wet oak tree leaves while helping a neighbor clean yard debris and children were diagnosed with contact dermatitis, his presentation is similar to theirs. He denies current itching and reports no additional symptoms of allergic reaction. He has not previously sought medical treatment for this rash due to work schedule constraints.     He had a kidney stone episode approximately one month ago at [facility]  - stating the stone measures 2 mm in size. He concurrently experienced a significant bilateral kidney infection which was more symptomatic than the stone itself. This was his second kidney stone event, with a prior episode involving 5-6 mm stones. He currently denies active kidney problems or ongoing symptoms and emphasizes the importance of hydration to prevent future kidney stone recurrence.     Denies smoking,  drinking, or illicit drug use. Denies chest pain, shortness of breath, cough, headache, dizziness, weakness, abdominal pain, nausea, vomiting, diarrhea, constipation, dysuria, depression, anxiety, SI, and HI.     Prostate Cancer Screening - Last PSA - 1.40 on 7/29/2025. 5/20/2024. Follow up annually.   Colon Cancer Screening - Deferred due to age.   Osteoporosis Screening - Deferred due to age.   Eye Exam -Several years. List of local eye doctors given to patient.   Dental Exam - Several years. List of local dentists given to patient.   Vaccinations: Flu - Not currently being offered, recommended annually  / Tetanus - Declines    **Addendum: completed labs after visit - result management.     Medical / Social / Family History     Past Medical History:   Diagnosis Date    Thyroid disease          History reviewed. No pertinent surgical history.      Social History  Bro Koo Jr.'s  reports that he has never smoked. He has never used smokeless tobacco. He reports current alcohol use of about 1.0 standard drink of alcohol per week. He reports that he does not use drugs.    Family History  Bro Koo Jr.'s family history includes Alcohol abuse in his maternal uncle; Arthritis in his maternal grandfather, maternal grandmother, and mother; Birth defects in his maternal grandmother; Cancer in his maternal grandfather; Depression in his maternal grandmother and mother; Hyperlipidemia in his maternal grandmother; Hypertension in his father and mother; Stroke in his maternal grandmother.    Medications and Allergies     Medications  Current Outpatient Medications   Medication Instructions    gabapentin (NEURONTIN) 300 mg, Oral, Nightly    hydrocortisone 2.5 % cream Topical (Top), 2 times daily    predniSONE (DELTASONE) 20 mg, Oral, 2 times daily       Allergies  Review of patient's allergies indicates:   Allergen Reactions    Meclizine Other (See Comments)     Severe mood swings       Subjective:  "    Review of Systems   Review of Systems   Constitutional: Negative.    HENT: Negative.     Eyes: Negative.    Respiratory: Negative.     Cardiovascular: Negative.    Gastrointestinal: Negative.    Endocrine: Negative.    Genitourinary: Negative.    Musculoskeletal: Negative.    Skin:  Positive for rash.   Allergic/Immunologic: Negative.    Neurological: Negative.    Hematological: Negative.    Psychiatric/Behavioral: Negative.     All other systems reviewed and are negative.       Objective:     Physical Examination   Visit Vitals  /82 (BP Location: Right arm, Patient Position: Sitting)   Pulse 73   Temp 98.3 °F (36.8 °C) (Oral)   Resp 17   Ht 5' 8" (1.727 m)   Wt 79.6 kg (175 lb 6.4 oz)   SpO2 98%   BMI 26.67 kg/m²     Physical Exam  Vitals reviewed.   Constitutional:       Appearance: Normal appearance. He is normal weight.   HENT:      Head: Normocephalic.      Nose: Nose normal.      Mouth/Throat:      Mouth: Mucous membranes are moist.      Pharynx: Oropharynx is clear.   Eyes:      Extraocular Movements: Extraocular movements intact.      Conjunctiva/sclera: Conjunctivae normal.      Pupils: Pupils are equal, round, and reactive to light.   Cardiovascular:      Rate and Rhythm: Normal rate and regular rhythm.      Heart sounds: Normal heart sounds.   Pulmonary:      Effort: Pulmonary effort is normal.      Breath sounds: Normal breath sounds.   Abdominal:      General: Abdomen is flat. Bowel sounds are normal.      Palpations: Abdomen is soft.      Tenderness: There is no right CVA tenderness or left CVA tenderness.      Comments: Multiple pink, pinpoint lesions kim a   Musculoskeletal:         General: Normal range of motion.      Cervical back: Normal range of motion.   Skin:     General: Skin is warm and dry.      Capillary Refill: Capillary refill takes less than 2 seconds.   Neurological:      General: No focal deficit present.      Mental Status: He is alert and oriented to person, place, and " "time.   Psychiatric:         Mood and Affect: Mood normal.           Results     Lab Results   Component Value Date    WBC 5.79 07/29/2025    RBC 4.78 07/29/2025    HGB 15.2 07/29/2025    HCT 42.1 07/29/2025    MCV 88.1 07/29/2025    MCH 31.8 (H) 07/29/2025    MCHC 36.1 (H) 07/29/2025    RDW 11.9 07/29/2025     07/29/2025    MPV 11.0 (H) 07/29/2025      Lab Results   Component Value Date     07/29/2025    K 3.6 07/29/2025    CO2 25 07/29/2025    BUN 9.8 07/29/2025    CREATININE 0.83 07/29/2025    ALBUMIN 4.4 07/29/2025    BILITOT 0.5 07/29/2025    ALKPHOS 69 07/29/2025    AST 18 07/29/2025    ALT 19 07/29/2025    AGAP 9.0 07/29/2025    EGFRNORACEVR >60 07/29/2025     Lab Results   Component Value Date    TSH 0.584 07/29/2025     Lab Results   Component Value Date    CHOL 158 07/29/2025    HDL 57 07/29/2025    TRIG 99 07/29/2025     Lab Results   Component Value Date    SGUA 1.019 07/29/2025    PROTEINUA Negative 07/29/2025    BILIRUBINUA Negative 07/29/2025    WBCUA 0-5 07/29/2025    RBCUA 6-10 (A) 07/29/2025    BACTERIA None Seen 07/29/2025    LEUKOCYTESUR Negative 07/29/2025    UROBILINOGEN Normal 07/29/2025     Lab Results   Component Value Date    CREATRANDUR 116.5 07/29/2025    MICALBCREAT 5.6 07/29/2025     Lab Results   Component Value Date    OTNXSCYZ50SO 25 (L) 07/29/2025     Lab Results   Component Value Date    HIV Nonreactive 05/20/2024    HEPAIGM Nonreactive 05/20/2024    HEPBSAG Nonreactive 05/20/2024    HEPCAB Nonreactive 05/20/2024     No results found for: "FITDIAG", "COLOGUARD"  No results found for: "OCCBLDIA"    Assessment and Plan (including Health Maintenance):     Assessment and Plan     Problem List Items Addressed This Visit       Well adult exam - Primary    Current Assessment & Plan   Wellness labs - 7/29/2025.   Prostate Cancer Screening - Last PSA - 1.40 on 7/29/2025. 5/20/2024. Follow up annually.   Colon Cancer Screening - Deferred due to age.   Osteoporosis Screening - " Deferred due to age.   Eye Exam -Several years. List of local eye doctors given to patient.   Dental Exam - Several years. List of local dentists given to patient.   Vaccinations: Flu - Not currently being offered, recommended annually  / Tetanus - Declines         Relevant Orders    Microalbumin/Creatinine Ratio, Urine    Comprehensive Metabolic Panel    CBC Auto Differential    Urinalysis    BMI 26.0-26.9,adult    Current Assessment & Plan   Body mass index is 26.67 kg/m². (At goal).          H/O hyperthyroidism    Relevant Orders    T4, Free    TSH    Vitamin D insufficiency    Current Assessment & Plan   Lab Results   Component Value Date    [id number] 25 (L) 07/29/2025     Educated on increasing foods high in Vitamin D such as fish oil, cod liver oil, salmon, milk fortified with vitamin D.  Begin taking Vitamin D 2000 I.U. tablets daily (purchase over the counter).  Repeat Vitamin D level as ordered.          Relevant Orders    Vitamin D    Skin eruption    Current Assessment & Plan   Monitored patient with widespread rash over legs and feet, likely due to contact with wet oak tree leaves.  Prescribed Prednisone twice daily for 3 days and recommended cortisone cream for rough or itchy areas.  Advised using non-scented sensitive skin soap and products during outbreaks to avoid further irritation.  Ordered allergy blood test panel to evaluate possible grass allergy, and discussed the difference between preliminary allergy labs and comprehensive testing performed by an allergist.         Relevant Medications    predniSONE (DELTASONE) 20 MG tablet    hydrocortisone 2.5 % cream    Other Relevant Orders    Allergen Stinging Insect Profile IgE    Allergen Nut Panel IgE    Allergen Food Panel IgE    Allergen Respiratory Panel IgE - Region 6    Immunization due    Current Assessment & Plan   Tetanus vaccine was administered.  Ok to take acetaminophen for soreness of arm.         Relevant Medications    Tdap vaccine  injection 0.5 mL (Completed)    Macrocytosis    Current Assessment & Plan    Latest Reference Range & Units 07/29/25 14:05   MCH 27.0 - 31.0 pg 31.8 (H)   MCHC 33.0 - 36.0 g/dL 36.1 (H)   (H): Data is abnormally high  Take MVI daily.         Relevant Orders    Folate    Vitamin B12     Other Visit Diagnoses         Screening for lipid disorders        Relevant Orders    Lipid Panel      Screening for prostate cancer        Relevant Orders    PSA, Screening      Screening for diabetes mellitus        Relevant Orders    Hemoglobin A1C              Health Maintenance/Immunizations     Health Maintenance Due   Topic Date Due    COVID-19 Vaccine (1 - 2024-25 season) Never done     All of your core healthy metrics are met.      Health Maintenance Topics with due status: Not Due       Topic Last Completion Date    Influenza Vaccine 12/05/2023    TETANUS VACCINE 07/29/2025    Hemoglobin A1c (Diabetic Prevention Screening) 07/29/2025    Lipid Panel 07/29/2025    RSV Vaccine (Age 60+ and Pregnant patients) Not Due     Immunization History   Administered Date(s) Administered    DTP [birth year], 11/10/1988, 07/30/1992    HIB 03/22/1989    Hepatitis B, Pediatric/Adolescent 05/09/2003, 06/13/2003, 11/14/2003    Influenza - Quadrivalent 09/04/2018, 12/05/2023    Influenza - Trivalent - Fluarix, Flulaval, Fluzone, Afluria - PF 09/04/2018    MMR 07/05/1988, 07/30/1992    OPV 1985, [birth year], 11/10/1988, 07/30/1992    Td (ADULT) 05/09/2003    Tdap 07/29/2025          Future Appointments   Date Time Provider Department Center   8/3/2026  2:40 PM Tammy Branch FNP [facility]        Follow up in about 1 year (around 7/29/2026) for F2F, Lab review, Med check, Wellness, RTC PRN.          Signature:        ILAN Schroeder  [facility]  [address] [address]    Date of encounter: 7/29/25    This  note was generated with the assistance of ambient listening technology. Verbal consent was obtained by the patient and accompanying visitor(s) for the recording of patient appointment to facilitate this note. I attest to having reviewed and edited the generated note for accuracy, though some syntax or spelling errors may persist. Please contact the author of this note for any clarification.

## 2025-07-29 NOTE — ASSESSMENT & PLAN NOTE
Wellness labs - 7/29/2025.   Prostate Cancer Screening - Last PSA - 1.40 on 7/29/2025. 5/20/2024. Follow up annually.   Colon Cancer Screening - Deferred due to age.   Osteoporosis Screening - Deferred due to age.   Eye Exam -Several years. List of local eye doctors given to patient.   Dental Exam - Several years. List of local dentists given to patient.   Vaccinations: Flu - Not currently being offered, recommended annually  / Tetanus - Declines

## 2025-07-29 NOTE — ASSESSMENT & PLAN NOTE
Monitored patient with widespread rash over legs and feet, likely due to contact with wet oak tree leaves.  Prescribed Prednisone twice daily for 3 days and recommended cortisone cream for rough or itchy areas.  Advised using non-scented sensitive skin soap and products during outbreaks to avoid further irritation.  Ordered allergy blood test panel to evaluate possible grass allergy, and discussed the difference between preliminary allergy labs and comprehensive testing performed by an allergist.

## 2025-07-29 NOTE — ASSESSMENT & PLAN NOTE
Lab Results   Component Value Date    [id number] 25 (L) 07/29/2025     Educated on increasing foods high in Vitamin D such as fish oil, cod liver oil, salmon, milk fortified with vitamin D.  Begin taking Vitamin D 2000 I.U. tablets daily (purchase over the counter).  Repeat Vitamin D level as ordered.

## 2025-07-29 NOTE — ASSESSMENT & PLAN NOTE
Latest Reference Range & Units 07/29/25 14:05   MCH 27.0 - 31.0 pg 31.8 (H)   MCHC 33.0 - 36.0 g/dL 36.1 (H)   (H): Data is abnormally high  Take MVI daily.

## 2025-07-31 ENCOUNTER — TELEPHONE (OUTPATIENT)
Dept: INTERNAL MEDICINE | Facility: CLINIC | Age: 40
End: 2025-07-31
Payer: MEDICAID

## 2025-07-31 LAB
ALLERGEN ALMOND IGE (OLG): 2.16 KUA/L
ALLERGEN ALTERNARIA ALTERNATA IGE (OLG): <0.1 KUA/L
ALLERGEN ASPERGILLUS FUMIGATUS IGE (OLG): <0.1 KUA/L
ALLERGEN BERMUDA GRASS IGE (OLG): 31 KUA/L
ALLERGEN BOXELDER MAPLE TREE IGE (OLG): 11.7 KUA/L
ALLERGEN BRAZIL NUT IGE (OLG): 0.39 KUA/L
ALLERGEN CASHEW NUT IGE (OLG): 0.62 KUA/L
ALLERGEN CASHEW NUT RANA O 3 IGE (OLG): <0.1 KUA/L
ALLERGEN CAT DANDER IGE (OLG): <0.1 KUA/L
ALLERGEN CLADOSPORIUM HERBARUM IGE (OLG): 0.15 KUA/L
ALLERGEN COCKROACH GERMAN IGE (OLG): 1.89 KUA/L
ALLERGEN CODFISH IGE (OLG): <0.1 KUA/L
ALLERGEN COMMON RAGWEED IGE (OLG): 9.77 KUA/L
ALLERGEN COMMON WASP (YELLOW JACKET) IGE (OLG): 1.82 KUA/L
ALLERGEN CRAB IGE (OLG): 1.04 KUA/L
ALLERGEN DOG DANDER IGE (OLG): 0.31 KUA/L
ALLERGEN DOG RCAN F 1 IGE (OLG): <0.1 KUA/L
ALLERGEN DOG RCAN F 2 IGE (OLG): <0.1 KUA/L
ALLERGEN DOG RCAN F 3 IGE (OLG): <0.1 KUA/L
ALLERGEN DOG RCAN F 4 IGE (OLG): <0.1 KUA/L
ALLERGEN DOG RCAN F 5 IGE (OLG): <0.1 KUA/L
ALLERGEN DOG RCAN F 6 IGE (OLG): <0.1 KUA/L
ALLERGEN DUST MITE (D. PTERONYSSINUS) IGE (OLG): 0.29 KUA/L
ALLERGEN DUST MITE (D.FARINAE) IGE (OLG): 0.58 KUA/L
ALLERGEN EGG NGAL D 1 IGE (OLG): <0.1 KUA/L
ALLERGEN EGG NGAL D 2 IGE (OLG): 0.12 KUA/L
ALLERGEN EGG WHITE IGE (OLG): 0.11 KUA/L
ALLERGEN EGG YOLK IGE (OLG): <0.1 KUA/L
ALLERGEN ELM TREE IGE (OLG): 8.86 KUA/L
ALLERGEN HAZELNUT IGE (OLG): 1.77 KUA/L
ALLERGEN HAZELNUT NCOR A 9 IGE (OLG): 0.43 KUA/L
ALLERGEN HAZELNUT RCOR A 14 IGE (OLG): <0.1 KUA/L
ALLERGEN HAZELNUT RCOR A 8 IGE (OLG): <0.1 KUA/L
ALLERGEN HAZLENUT RCOR A 1 IGE (OLG): <0.1 KUA/L
ALLERGEN HONEY BEE IGE (OLG): 2.59 KUA/L
ALLERGEN HORSE DANDER IGE (OLG): <0.1 KUA/L
ALLERGEN MILK IGE (OLG): 0.12 KUA/L
ALLERGEN MILK NBOS D 4 IGE (OLG): 0.16 KUA/L
ALLERGEN MILK NBOS D 5 IGE (OLG): <0.1 KUA/L
ALLERGEN MILK NBOS D 8 IGE (OLG): <0.1 KUA/L
ALLERGEN MOUNTAIN JUNIPER TREE IGE (OLG): 2.91 KUA/L
ALLERGEN MOUSE URINE PROTEINS IGE (OLG): <0.1 KUA/L
ALLERGEN MULBERRY TREE IGE (OLG): 1.8 KUA/L
ALLERGEN OAK TREE IGE (OLG): 7.78 KUA/L
ALLERGEN PAPER WASP IGE (OLG): 1.74 KUA/L
ALLERGEN PEANUT IGE (OLG): 4.58 KUA/L
ALLERGEN PEANUT RARA H 1 IGE (OLG): <0.1 KUA/L
ALLERGEN PEANUT RARA H 2 IGE (OLG): <0.1 KUA/L
ALLERGEN PEANUT RARA H 3 IGE (OLG): <0.1 KUA/L
ALLERGEN PEANUT RARA H 6 IGE (OLG): <0.1 KUA/L
ALLERGEN PEANUT RARA H 8 IGE (OLG): <0.1 KUA/L
ALLERGEN PEANUT RARA H 9 IGE (OLG): <0.1 KUA/L
ALLERGEN PECAN HICKORY TREE IGE (OLG): 10.1 KUA/L
ALLERGEN PENICILLIUM CHRYSOGENUM IGE (OLG): <0.1 KUA/L
ALLERGEN PIGWEED IGE (OLG): 7.71 KUA/L
ALLERGEN ROUGH MARSH ELDER IGE (OLG): 4.02 KUA/L
ALLERGEN SALMON IGE (OLG): <0.1 KUA/L
ALLERGEN SCALLOP IGE (OLG): 1.52 KUA/L
ALLERGEN SESAME SEED IGE (OLG): 3.96 KUA/L
ALLERGEN SHRIMP IGE (OLG): 0.58 KUA/L
ALLERGEN SILVER BIRCH TREE IGE (OLG): 9.17 KUA/L
ALLERGEN SOY BEAN IGE (OLG): 1.9 KUA/L
ALLERGEN TIMOTHY GRASS IGE (OLG): 24.3 KUA/L
ALLERGEN TUNA IGE (OLG): <0.1 KUA/L
ALLERGEN WALNUT IGE (OLG): 2.46 KUA/L
ALLERGEN WALNUT RJUG R 1 IGE (OLG): <0.1 KUA/L
ALLERGEN WALNUT RJUG R 3 IGE (OLG): <0.1 KUA/L
ALLERGEN WALNUT TREE IGE (OLG): 14.8 KUA/L
ALLERGEN WHEAT IGE (OLG): 2.92 KUA/L
ALLERGEN WHITE FACED HORNET IGE (OLG): 2.05 KUA/L
ALLERGEN YELLOW HORNET IGE (OLG): 1.81 KUA/L
Lab: 0.1 KUA/L
Lab: 0.22 KUA/L
Lab: 0.25 KUA/L
Lab: 1.74 KUA/L
Lab: <0.1 KUA/L
PHADIOTOP IGE QN: 218 KU/L
PHADIOTOP IGE QN: 218 KU/L
PHADIOTOP IGE QN: 232 KU/L

## 2025-07-31 NOTE — TELEPHONE ENCOUNTER
----- Message from ILAN Wilson sent at 7/31/2025  7:01 AM CDT -----  Please inform Bro Koo Jr. of the following:    Urinalysis shows trace blood in his urine - I will refer you to the urology clinic.   Vitamin D is slightly decreased - I will prescribe weekly vitamin D3.  You have abnormalities in your  CBC which could indicate a folate or B12 deficiency - I recommend you to take a multivitamin once a day.    Thank you,    RANDALL Rosado   ----- Message -----  From: Lab, Background User  Sent: 7/29/2025   2:16 PM CDT  To: ILAN Schroeder

## 2025-08-05 ENCOUNTER — HOSPITAL ENCOUNTER (OUTPATIENT)
Dept: RADIOLOGY | Facility: HOSPITAL | Age: 40
Discharge: HOME | End: 2025-08-05
Attending: NURSE PRACTITIONER
Payer: MEDICAID

## 2025-08-05 ENCOUNTER — OFFICE VISIT (OUTPATIENT)
Dept: UROLOGY | Facility: CLINIC | Age: 40
End: 2025-08-05
Payer: MEDICAID

## 2025-08-05 VITALS
HEART RATE: 67 BPM | RESPIRATION RATE: 18 BRPM | DIASTOLIC BLOOD PRESSURE: 92 MMHG | HEIGHT: 68 IN | WEIGHT: 171 LBS | SYSTOLIC BLOOD PRESSURE: 137 MMHG | OXYGEN SATURATION: 98 % | BODY MASS INDEX: 25.91 KG/M2

## 2025-08-05 DIAGNOSIS — N20.0 KIDNEY STONES: Primary | ICD-10-CM

## 2025-08-05 DIAGNOSIS — N20.0 KIDNEY STONES: ICD-10-CM

## 2025-08-05 DIAGNOSIS — R31.29 MICROSCOPIC HEMATURIA: ICD-10-CM

## 2025-08-05 PROCEDURE — 3044F HG A1C LEVEL LT 7.0%: CPT | Mod: CPTII,,, | Performed by: NURSE PRACTITIONER

## 2025-08-05 PROCEDURE — 3075F SYST BP GE 130 - 139MM HG: CPT | Mod: CPTII,,, | Performed by: NURSE PRACTITIONER

## 2025-08-05 PROCEDURE — 3080F DIAST BP >= 90 MM HG: CPT | Mod: CPTII,,, | Performed by: NURSE PRACTITIONER

## 2025-08-05 PROCEDURE — 3008F BODY MASS INDEX DOCD: CPT | Mod: CPTII,,, | Performed by: NURSE PRACTITIONER

## 2025-08-05 PROCEDURE — 74018 RADEX ABDOMEN 1 VIEW: CPT | Mod: TC

## 2025-08-05 PROCEDURE — 99214 OFFICE O/P EST MOD 30 MIN: CPT | Mod: PBBFAC | Performed by: NURSE PRACTITIONER

## 2025-08-05 PROCEDURE — 99204 OFFICE O/P NEW MOD 45 MIN: CPT | Mod: S$PBB,,, | Performed by: NURSE PRACTITIONER

## 2025-08-05 PROCEDURE — 1160F RVW MEDS BY RX/DR IN RCRD: CPT | Mod: CPTII,,, | Performed by: NURSE PRACTITIONER

## 2025-08-05 PROCEDURE — 1159F MED LIST DOCD IN RCRD: CPT | Mod: CPTII,,, | Performed by: NURSE PRACTITIONER

## 2025-08-05 RX ORDER — TAMSULOSIN HYDROCHLORIDE 0.4 MG/1
0.4 CAPSULE ORAL DAILY
Qty: 30 CAPSULE | Refills: 11 | Status: SHIPPED | OUTPATIENT
Start: 2025-08-05 | End: 2026-08-05

## 2025-08-05 NOTE — LETTER
August 5, 2025      [facility]  [address] [address]  Phone: [phone]       Patient: Bro Koo   YOB: 1985  Date of Visit: 08/05/2025    To Whom It May Concern:    Bro Koo  was at [facility] on 08/05/2025. The patient may return to work on 08/06/2025. If you have any questions or concerns, or if I can be of further assistance, please do not hesitate to contact me.    Sincerely,    Emerson Okeefe LPN

## 2025-08-05 NOTE — PROGRESS NOTES
Patient seen by Gabriel STEPHENSON. Pt will RTC  3 weeks. Pt eduction given both written and verbal.

## 2025-08-05 NOTE — PROGRESS NOTES
Chief Complaint:   Chief Complaint   Patient presents with    Microscopic hematuria       HPI:   Patient is a 32-year-old male referred to Urology for hematuria, kidney stones.  Patient seen by PCP on 6/29/2025 with complaints kidney stone episode approximately one month ago at [facility]  - stating the stone measures 2 mm in size. He concurrently experienced a significant bilateral kidney infection which was more symptomatic than the stone itself. This was his second kidney stone event, with a prior episode involving 5-6 mm stones.  Patient previously she treated with antibiotics for kidney stones from [employer] [facility].  Today patient presents with mild moderate left flank pain.  Patient denies passing any stone therefore we will get a KUB x-ray now start patient on Flomax 0.4 mg p.o. daily if KUB nonconclusive then we will get CT to determine location of kidney stone, RTC 3 weeks for re-evaluation.      Allergies:  Review of patient's allergies indicates:   Allergen Reactions    Meclizine Other (See Comments)     Severe mood swings       Medications:  Current Medications[1]    Review of Systems:  General: No fever, chills, fatigability, or weight loss.  Skin: No rashes, itching, or changes in color or texture of skin.  Chest: Denies LEY, cyanosis, wheezing, cough, and sputum production.  Abdomen: Appetite fine. No weight loss. Denies diarrhea, abdominal pain, hematemesis, or blood in stool.  Musculoskeletal: No joint stiffness or swelling. Denies back pain.  : As above.  All other review of systems negative.    PMH:  Past Medical History:   Diagnosis Date    Thyroid disease        PSH:  No past surgical history on file.    FamHx:  Family History   Problem Relation Name Age of Onset    Hypertension Mother Yajaira     Arthritis Mother Yajaira     Depression Mother Yajaira     Hypertension Father      Arthritis Maternal Grandfather Vincent     Cancer Maternal Grandfather Vincent     Arthritis Maternal Grandmother  geraldine     Birth defects Maternal Grandmother geraldine     Depression Maternal Grandmother geraldine     Hyperlipidemia Maternal Grandmother geraldine     Stroke Maternal Grandmother geraldine     Alcohol abuse Maternal Uncle Lamberto        SocHx:  Social History[2]    Physical Exam:  Vitals:    08/05/25 1447   BP: (!) 137/92   Pulse:    Resp:      General: A&Ox3, no apparent distress, no deformities  Neck: No masses, normal thyroid  Lungs: CTA malena, no use of accessory muscles  Heart: RRR, no arrhythmias  Abdomen: Soft, NT, ND, no masses, no hernias, no hepatosplenomegaly  Lymphatic: Neck and groin nodes negative  Skin: The skin is warm and dry. No jaundice.  Ext: No c/c/e.        Labs:     Recent Labs     07/29/25  1405   PSA 1.40            Imaging:  CT of the abdomen pelvis on 06/23/2025 revealed: 2-3 mm calcification of the right ureterovesical junction contributing to mild-to-moderate right-sided hydronephrosis with perinephric fat  stranding.       Impression:  1. Microscopic hematuria  - Ambulatory referral/consult to Urology  2. Kidney stone    Plan:  Instructed patient to KUB today, start tamsulosin 0.4 mg p.o. daily.  If KUB nonconclusive then we will get a CT without contrast   Increase fluid intake, limit salt in diet, limit protein to 30%, intake adequate calcium, decrease brand, soy, Beets, Almonds, Rhubard, Buckwheat flour, baked potato, raspberry, potato chips Spinach, Miso, Tahini, sesame, Swiss Chard. Instructed patient on Avoiding bladder irritants, such as alcohol, citrus drinks or foods,salty foods, energy drinks, spicy foods, caffeine, sodas.  RTC 3 weeks to discuss results.  Instructed patient if develops any abnormal urologic symptoms notify clinic to be re-evaluate treated or during after hours go to emergency room versus urgent here.                           [facility]       [1]   Current Outpatient Medications   Medication Sig Dispense Refill    cholecalciferol, vitamin D3, 1,250 mcg (50,000 unit) capsule Take 1  capsule (50,000 Units total) by mouth every 7 days. 12 capsule 1    gabapentin (NEURONTIN) 300 MG capsule Take 1 capsule (300 mg total) by mouth every evening. 90 capsule 2    hydrocortisone 2.5 % cream Apply topically 2 (two) times daily. 28 g 1     No current facility-administered medications for this visit.   [2]   Social History  Socioeconomic History    Marital status:    Tobacco Use    Smoking status: Never    Smokeless tobacco: Never   Substance and Sexual Activity    Alcohol use: Yes     Alcohol/week: 1.0 standard drink of alcohol     Types: 1 Glasses of wine per week     Comment: Very very infrequent    Drug use: Never    Sexual activity: Yes     Partners: Female     Birth control/protection: Condom     Social Drivers of Health     Financial Resource Strain: Low Risk  (5/1/2024)    Overall Financial Resource Strain (CARDIA)     Difficulty of Paying Living Expenses: Not very hard   Food Insecurity: Food Insecurity Present (5/1/2024)    Hunger Vital Sign     Worried About Running Out of Food in the Last Year: Never true     Ran Out of Food in the Last Year: Sometimes true   Transportation Needs: No Transportation Needs (5/1/2024)    PRAPARE - Transportation     Lack of Transportation (Medical): No     Lack of Transportation (Non-Medical): No   Physical Activity: Insufficiently Active (5/1/2024)    Exercise Vital Sign     Days of Exercise per Week: 4 days     Minutes of Exercise per Session: 30 min   Stress: Stress Concern Present (5/1/2024)    [employer] - Occupational Stress Questionnaire     Feeling of Stress : To some extent   Housing Stability: Unknown (5/1/2024)    Housing Stability Vital Sign     Unable to Pay for Housing in the Last Year: No

## 2025-08-06 DIAGNOSIS — M54.12 CERVICAL RADICULOPATHY: ICD-10-CM

## 2025-08-06 RX ORDER — GABAPENTIN 300 MG/1
300 CAPSULE ORAL NIGHTLY
Qty: 90 CAPSULE | Refills: 2 | Status: SHIPPED | OUTPATIENT
Start: 2025-08-06 | End: 2026-08-06

## 2025-08-07 ENCOUNTER — TELEPHONE (OUTPATIENT)
Dept: INTERNAL MEDICINE | Facility: CLINIC | Age: 40
End: 2025-08-07
Payer: MEDICAID

## 2025-08-07 NOTE — TELEPHONE ENCOUNTER
----- Message from ILAN Wilson sent at 8/6/2025  4:59 PM CDT -----  Please inform Bro Koo Jr. of the following:    Allergy testing results:  - Multiple high environmental allergies (especially grasses and trees) -- may benefit from allergy shots   -Peanut sensitization but no positive components so may not be a true allergy  -Insect venom allergy   -Mild to moderate food allergies -- discuss which to avoid or retest  -May need a comprehensive management plan (including medications, avoidance, diet, and possible desensitization)    I will refer you to Dr. Hutchison - allergist and order an Epi Pen for you to carry around.    For any questions, please let me know.  Thank you,    RANDALL Rosado   ----- Message -----  From: Lab, Background User  Sent: 7/29/2025   2:16 PM CDT  To: ILAN Schroeder

## 2025-08-26 ENCOUNTER — OFFICE VISIT (OUTPATIENT)
Dept: UROLOGY | Facility: CLINIC | Age: 40
End: 2025-08-26
Payer: MEDICAID

## 2025-08-26 VITALS
WEIGHT: 172.63 LBS | SYSTOLIC BLOOD PRESSURE: 119 MMHG | OXYGEN SATURATION: 98 % | HEART RATE: 78 BPM | TEMPERATURE: 98 F | BODY MASS INDEX: 26.16 KG/M2 | DIASTOLIC BLOOD PRESSURE: 79 MMHG | HEIGHT: 68 IN

## 2025-08-26 DIAGNOSIS — N20.1 CALCULUS OF URETER: Primary | ICD-10-CM

## 2025-08-26 PROCEDURE — 99214 OFFICE O/P EST MOD 30 MIN: CPT | Mod: PBBFAC | Performed by: NURSE PRACTITIONER

## 2025-08-26 PROCEDURE — 3078F DIAST BP <80 MM HG: CPT | Mod: CPTII,,, | Performed by: NURSE PRACTITIONER

## 2025-08-26 PROCEDURE — 3008F BODY MASS INDEX DOCD: CPT | Mod: CPTII,,, | Performed by: NURSE PRACTITIONER

## 2025-08-26 PROCEDURE — 99213 OFFICE O/P EST LOW 20 MIN: CPT | Mod: S$PBB,,, | Performed by: NURSE PRACTITIONER

## 2025-08-26 PROCEDURE — 3074F SYST BP LT 130 MM HG: CPT | Mod: CPTII,,, | Performed by: NURSE PRACTITIONER

## 2025-08-26 PROCEDURE — 1159F MED LIST DOCD IN RCRD: CPT | Mod: CPTII,,, | Performed by: NURSE PRACTITIONER

## 2025-08-26 PROCEDURE — 3044F HG A1C LEVEL LT 7.0%: CPT | Mod: CPTII,,, | Performed by: NURSE PRACTITIONER
